# Patient Record
Sex: FEMALE | Race: WHITE | Employment: FULL TIME | URBAN - METROPOLITAN AREA
[De-identification: names, ages, dates, MRNs, and addresses within clinical notes are randomized per-mention and may not be internally consistent; named-entity substitution may affect disease eponyms.]

---

## 2017-04-27 PROBLEM — O34.40 HISTORY OF LEEP (LOOP ELECTROSURGICAL EXCISION PROCEDURE) OF CERVIX COMPLICATING PREGNANCY: Status: ACTIVE | Noted: 2017-04-27

## 2017-04-27 PROBLEM — Z34.80 ENCOUNTER FOR SUPERVISION OF OTHER NORMAL PREGNANCY: Status: ACTIVE | Noted: 2017-04-27

## 2017-04-27 PROBLEM — Z98.890 HISTORY OF LEEP (LOOP ELECTROSURGICAL EXCISION PROCEDURE) OF CERVIX COMPLICATING PREGNANCY: Status: ACTIVE | Noted: 2017-04-27

## 2017-04-27 PROCEDURE — 87491 CHLMYD TRACH DNA AMP PROBE: CPT | Performed by: OBSTETRICS & GYNECOLOGY

## 2017-04-27 PROCEDURE — 87624 HPV HI-RISK TYP POOLED RSLT: CPT | Performed by: OBSTETRICS & GYNECOLOGY

## 2017-04-27 PROCEDURE — 88175 CYTOPATH C/V AUTO FLUID REDO: CPT | Performed by: OBSTETRICS & GYNECOLOGY

## 2017-04-27 PROCEDURE — 87591 N.GONORRHOEAE DNA AMP PROB: CPT | Performed by: OBSTETRICS & GYNECOLOGY

## 2017-06-07 PROCEDURE — 87389 HIV-1 AG W/HIV-1&-2 AB AG IA: CPT | Performed by: OBSTETRICS & GYNECOLOGY

## 2017-06-07 PROCEDURE — 85025 COMPLETE CBC W/AUTO DIFF WBC: CPT | Performed by: OBSTETRICS & GYNECOLOGY

## 2017-06-07 PROCEDURE — 86900 BLOOD TYPING SEROLOGIC ABO: CPT | Performed by: OBSTETRICS & GYNECOLOGY

## 2017-06-07 PROCEDURE — 36415 COLL VENOUS BLD VENIPUNCTURE: CPT | Performed by: OBSTETRICS & GYNECOLOGY

## 2017-06-07 PROCEDURE — 86780 TREPONEMA PALLIDUM: CPT | Performed by: OBSTETRICS & GYNECOLOGY

## 2017-06-07 PROCEDURE — 86901 BLOOD TYPING SEROLOGIC RH(D): CPT | Performed by: OBSTETRICS & GYNECOLOGY

## 2017-06-07 PROCEDURE — 86850 RBC ANTIBODY SCREEN: CPT | Performed by: OBSTETRICS & GYNECOLOGY

## 2017-06-07 PROCEDURE — 86762 RUBELLA ANTIBODY: CPT | Performed by: OBSTETRICS & GYNECOLOGY

## 2017-06-07 PROCEDURE — 87340 HEPATITIS B SURFACE AG IA: CPT | Performed by: OBSTETRICS & GYNECOLOGY

## 2017-06-08 PROBLEM — O99.210 OBESITY AFFECTING PREGNANCY, ANTEPARTUM: Status: ACTIVE | Noted: 2017-06-08

## 2017-06-26 PROCEDURE — 87186 SC STD MICRODIL/AGAR DIL: CPT | Performed by: OBSTETRICS & GYNECOLOGY

## 2017-06-26 PROCEDURE — 87086 URINE CULTURE/COLONY COUNT: CPT | Performed by: OBSTETRICS & GYNECOLOGY

## 2017-06-26 PROCEDURE — 87088 URINE BACTERIA CULTURE: CPT | Performed by: OBSTETRICS & GYNECOLOGY

## 2017-07-27 NOTE — PROGRESS NOTES
Outpatient Maternal-Fetal Medicine Consultation    Dear Dr. Jose F Barrera    Thank you for requesting ultrasound evaluation and maternal fetal medicine consultation on your patient Manuela Draper.   As you are aware she is a 28year old female  with a Sin ____________________________________________________________________________  Dating:  LMP: 02/28/2017 EDC: 12/05/2017 GA by LMP: [id number]  Current Scan on: 07/28/2017 EDC: 11/27/2017 GA by current scan: 22w4d  The calculation of the gestational age by curr interpreted the results and reviewed them with the patient. DISCUSSION  During her visit we discussed and reviewed the following issues:  PRIOR LEEP  We discussed her history of LEEP and potential impact on her risk for  delivery.   Surgical histo such as diabetes mellitus. Data suggest that obese women should be encouraged to undertake a weight reduction program (diet, exercise, behavior modification, and possibly bariatric surgery in some cases) prior to attempting to conceive.             Mariana Parada prepregnancy obesity and excessive maternal weight gain before or during pregnancy contribute to an increased probability of  delivery.   It has also been hypothesized that obesity may lead to dystocia due to increased soft tissue deposition in the

## 2017-07-28 ENCOUNTER — OFFICE VISIT (OUTPATIENT)
Dept: PERINATAL CARE | Facility: HOSPITAL | Age: 32
End: 2017-07-28
Attending: OBSTETRICS & GYNECOLOGY
Payer: COMMERCIAL

## 2017-07-28 VITALS
DIASTOLIC BLOOD PRESSURE: 60 MMHG | WEIGHT: 250 LBS | BODY MASS INDEX: 41 KG/M2 | SYSTOLIC BLOOD PRESSURE: 120 MMHG | HEART RATE: 91 BPM

## 2017-07-28 DIAGNOSIS — Z98.890 HISTORY OF LEEP (LOOP ELECTROSURGICAL EXCISION PROCEDURE) OF CERVIX COMPLICATING PREGNANCY, SECOND TRIMESTER: ICD-10-CM

## 2017-07-28 DIAGNOSIS — O34.42 HISTORY OF LEEP (LOOP ELECTROSURGICAL EXCISION PROCEDURE) OF CERVIX COMPLICATING PREGNANCY, SECOND TRIMESTER: ICD-10-CM

## 2017-07-28 DIAGNOSIS — O99.212 OBESITY AFFECTING PREGNANCY, ANTEPARTUM, SECOND TRIMESTER: ICD-10-CM

## 2017-07-28 PROCEDURE — 99243 OFF/OP CNSLTJ NEW/EST LOW 30: CPT

## 2017-09-20 PROCEDURE — 86780 TREPONEMA PALLIDUM: CPT | Performed by: OBSTETRICS & GYNECOLOGY

## 2017-09-20 PROCEDURE — 82950 GLUCOSE TEST: CPT | Performed by: OBSTETRICS & GYNECOLOGY

## 2017-09-24 ENCOUNTER — HOSPITAL ENCOUNTER (OUTPATIENT)
Facility: HOSPITAL | Age: 32
Setting detail: OBSERVATION
Discharge: HOME | End: 2017-09-24
Attending: OBSTETRICS & GYNECOLOGY | Admitting: OBSTETRICS & GYNECOLOGY
Payer: COMMERCIAL

## 2017-09-24 VITALS — BODY MASS INDEX: 45.07 KG/M2 | WEIGHT: 264 LBS | HEIGHT: 64 IN | TEMPERATURE: 98 F | RESPIRATION RATE: 18 BRPM

## 2017-09-24 PROBLEM — Z34.90 PREGNANCY: Status: ACTIVE | Noted: 2017-09-24

## 2017-09-24 LAB
BILIRUBIN URINE: NEGATIVE
BLOOD URINE: NEGATIVE
CONTROL RUN WITHIN 24 HOURS?: YES
GLUCOSE URINE: NEGATIVE
KETONE URINE: NEGATIVE
NITRITE URINE: NEGATIVE
PH URINE: 7 (ref 5–8)
PROTEIN URINE: NEGATIVE
SPEC GRAVITY: 1.01 (ref 1–1.03)
UROBILINOGEN URINE: 0.2

## 2017-09-24 PROCEDURE — 81002 URINALYSIS NONAUTO W/O SCOPE: CPT

## 2017-09-24 PROCEDURE — 84112 EVAL AMNIOTIC FLUID PROTEIN: CPT

## 2017-09-24 NOTE — PROGRESS NOTES
Received th ept to the unit with c/o decreased fm and abd pressure. Pt to triage room to change and to void. Pt then into bed that is in the low locked position. efm explained and then applied. Hx obtained- pt is a 31  with an 29.5 week iup.   Pt c/

## 2017-09-29 PROCEDURE — 82952 GTT-ADDED SAMPLES: CPT | Performed by: OBSTETRICS & GYNECOLOGY

## 2017-09-29 PROCEDURE — 36415 COLL VENOUS BLD VENIPUNCTURE: CPT | Performed by: OBSTETRICS & GYNECOLOGY

## 2017-09-29 PROCEDURE — 82951 GLUCOSE TOLERANCE TEST (GTT): CPT | Performed by: OBSTETRICS & GYNECOLOGY

## 2017-10-09 NOTE — PROGRESS NOTES
Giles Haney    Dear Dr. Ashlee Lopez    Thank you for requesting ultrasound evaluation and maternal fetal medicine consultation on your patient Jared Mays.   As you are aware she is a 28year old female  with a [place] cerebellum. Heart: suboptimal.   Gastrointestinal Tract: stomach visible.     Summary of Ultrasound Findings:  Impression: Borderline LGA    ____________________________________________________________________________  Fetal Wellbeing Assessment:  Amniotic postprandial values should be less than 120 mg/dl. She was also  Advised to send her capillary blood glucose records to our office for weekly MFM review. Insulin therapy may be started depending on her blood sugar levels.     Her capillary blood glucose r

## 2017-10-10 ENCOUNTER — OFFICE VISIT (OUTPATIENT)
Dept: PERINATAL CARE | Facility: HOSPITAL | Age: 32
End: 2017-10-10
Attending: OBSTETRICS & GYNECOLOGY
Payer: COMMERCIAL

## 2017-10-10 VITALS
SYSTOLIC BLOOD PRESSURE: 125 MMHG | HEART RATE: 109 BPM | DIASTOLIC BLOOD PRESSURE: 81 MMHG | BODY MASS INDEX: 45 KG/M2 | WEIGHT: 265 LBS

## 2017-10-10 DIAGNOSIS — O99.210 OBESITY AFFECTING PREGNANCY, ANTEPARTUM: ICD-10-CM

## 2017-10-10 DIAGNOSIS — O24.419 GESTATIONAL DIABETES MELLITUS (GDM) IN THIRD TRIMESTER, GESTATIONAL DIABETES METHOD OF CONTROL UNSPECIFIED: ICD-10-CM

## 2017-10-10 PROCEDURE — 76805 OB US >/= 14 WKS SNGL FETUS: CPT | Performed by: OBSTETRICS & GYNECOLOGY

## 2017-10-10 PROCEDURE — 99242 OFF/OP CONSLTJ NEW/EST SF 20: CPT | Performed by: OBSTETRICS & GYNECOLOGY

## 2017-10-10 PROCEDURE — 76819 FETAL BIOPHYS PROFIL W/O NST: CPT | Performed by: OBSTETRICS & GYNECOLOGY

## 2017-10-20 ENCOUNTER — TELEPHONE (OUTPATIENT)
Dept: PERINATAL CARE | Facility: HOSPITAL | Age: 32
End: 2017-10-20

## 2017-10-30 ENCOUNTER — HOSPITAL ENCOUNTER (OUTPATIENT)
Facility: HOSPITAL | Age: 32
Setting detail: OBSERVATION
Discharge: HOME | End: 2017-10-31
Attending: OBSTETRICS & GYNECOLOGY | Admitting: OBSTETRICS & GYNECOLOGY
Payer: COMMERCIAL

## 2017-10-30 PROBLEM — O60.00 PRETERM LABOR: Status: ACTIVE | Noted: 2017-10-30

## 2017-10-30 PROCEDURE — 82962 GLUCOSE BLOOD TEST: CPT

## 2017-10-30 PROCEDURE — 81002 URINALYSIS NONAUTO W/O SCOPE: CPT

## 2017-10-30 PROCEDURE — 82731 ASSAY OF FETAL FIBRONECTIN: CPT | Performed by: OBSTETRICS & GYNECOLOGY

## 2017-10-30 RX ORDER — ACETAMINOPHEN 500 MG
500 TABLET ORAL EVERY 6 HOURS PRN
Status: DISCONTINUED | OUTPATIENT
Start: 2017-10-30 | End: 2017-10-31

## 2017-10-30 RX ORDER — CALCIUM CARBONATE 200(500)MG
1000 TABLET,CHEWABLE ORAL
Status: DISCONTINUED | OUTPATIENT
Start: 2017-10-30 | End: 2017-10-31

## 2017-10-30 RX ORDER — NIFEDIPINE 10 MG/1
10 CAPSULE ORAL
Status: DISCONTINUED | OUTPATIENT
Start: 2017-10-30 | End: 2017-10-30

## 2017-10-30 RX ORDER — ACETAMINOPHEN 500 MG
1000 TABLET ORAL EVERY 6 HOURS PRN
Status: DISCONTINUED | OUTPATIENT
Start: 2017-10-30 | End: 2017-10-31

## 2017-10-30 RX ORDER — NIFEDIPINE 10 MG/1
10 CAPSULE ORAL EVERY 6 HOURS
Status: DISCONTINUED | OUTPATIENT
Start: 2017-10-30 | End: 2017-10-31

## 2017-10-30 RX ORDER — ZOLPIDEM TARTRATE 5 MG/1
5 TABLET ORAL NIGHTLY PRN
Status: DISCONTINUED | OUTPATIENT
Start: 2017-10-30 | End: 2017-10-31

## 2017-10-30 RX ORDER — DOCUSATE SODIUM 100 MG/1
100 CAPSULE, LIQUID FILLED ORAL 2 TIMES DAILY PRN
Status: DISCONTINUED | OUTPATIENT
Start: 2017-10-30 | End: 2017-10-31

## 2017-10-31 VITALS
BODY MASS INDEX: 47.13 KG/M2 | HEIGHT: 63 IN | WEIGHT: 266 LBS | RESPIRATION RATE: 18 BRPM | SYSTOLIC BLOOD PRESSURE: 130 MMHG | HEART RATE: 89 BPM | TEMPERATURE: 98 F | DIASTOLIC BLOOD PRESSURE: 73 MMHG

## 2017-10-31 PROCEDURE — 82962 GLUCOSE BLOOD TEST: CPT

## 2017-10-31 PROCEDURE — 59025 FETAL NON-STRESS TEST: CPT

## 2017-10-31 NOTE — H&P
[facility]  Labor and Delivery Prenatal History and Physical Interval Addendum/Triage assessment  Please see full Prenatal Record for this pregnancy      SUBJECTIVE:    Interval History:      This is a pregnancy at 29 6/7 weeks who presents to Labor and Del

## 2017-10-31 NOTE — PROGRESS NOTES
Dr. Melvina Hager at bedside evaluating patient in person. No new complaints. Dr. Melvina Hager states patient can stop taking procardia and will be discharged home. Patient states no noticeable uterine activity or contractions. No leaking of fluid noted.  Non stress test

## 2017-10-31 NOTE — PLAN OF CARE
ANTEPARTUM/LABOR and DELIVERY    • Maintain pregnancy as long as maternal and/or fetal condition is stable Completed    • Anxiety is at manageable level Completed    • Demonstrates ability to cope with hospitalization/illness Completed

## 2017-10-31 NOTE — PROGRESS NOTES
Feeling contractions much less  FFN neg  UC still noted on EFM but less frequent and harder to determine. Given degree of contractions on presentation, and cervical effacement, will admit for observation overnight and continue nifedipine.

## 2017-10-31 NOTE — PROGRESS NOTES
Patient instructed on discharge education.  Educated to follow up with OB provider if uterine activity becomes more frequent or stronger, experiences low pressure, abdominal pain, vaginal bleeding, fluid leaking, or signs and symptoms of preeclampsia (expla

## 2017-10-31 NOTE — PROGRESS NOTES
Received pt to the unit via ambulation with c/o ctx q 9 min all day. Pt to triage room to change and to void. Pt then into bed that is in the low locked position. efm explained and then applied. Abdomen is soft and non-tender.   Hx obtained- pt is a 31

## 2017-11-01 NOTE — PAYOR COMM NOTE
--------------  CONTINUED STAY REVIEW    Payor: [address] #:  [id number]  Authorization Number: [id number]    Admit date: N/A  Admit time: N/A    Admitting Physician: Linus Johnson MD  Attending Physician:  No att.

## 2017-11-01 NOTE — PAYOR COMM NOTE
--------------  ADMISSION REVIEW     Payor: [address] [facility] [place] #:  [id number]  Authorization Number: [id number]    Admit date: N/A  Admit time: N/A       Admitting Physician: Angelique Neri MD  Attending Physician:  No att.

## 2017-11-06 ENCOUNTER — TELEPHONE (OUTPATIENT)
Dept: PERINATAL CARE | Facility: HOSPITAL | Age: 32
End: 2017-11-06

## 2017-11-08 PROCEDURE — 87081 CULTURE SCREEN ONLY: CPT | Performed by: OBSTETRICS & GYNECOLOGY

## 2017-11-08 PROCEDURE — 87653 STREP B DNA AMP PROBE: CPT | Performed by: OBSTETRICS & GYNECOLOGY

## 2017-11-09 NOTE — PROGRESS NOTES
Jero Kinney  Dear Dr. Gen Stevens     Thank you for requesting ultrasound evaluation and maternal fetal medicine consultation on your patient Neno Diaz.   As you are aware she is a 28year old female  with a Si suggest.    ____________________________________________________________________________  Fetal Wellbeing Assessment:  Amniotic fluid: normal. AMY: 18.7 cm.    Biophysical Profile: Fetal body movements: normal (2), Fetal tone: normal (2), Fetal breathing mo glucose values  · Delivery at 38 weeks  · Weekly NSTs at 32 weeks; twice weekly NST's at 36 weeks     Thank you for allowing me to participate in the care of your patient. Please do not hesitate to contact me if additional questions or concerns arise.

## 2017-11-10 ENCOUNTER — OFFICE VISIT (OUTPATIENT)
Dept: PERINATAL CARE | Facility: HOSPITAL | Age: 32
End: 2017-11-10
Attending: OBSTETRICS & GYNECOLOGY
Payer: COMMERCIAL

## 2017-11-10 VITALS
WEIGHT: 270 LBS | DIASTOLIC BLOOD PRESSURE: 80 MMHG | RESPIRATION RATE: 20 BRPM | SYSTOLIC BLOOD PRESSURE: 133 MMHG | BODY MASS INDEX: 43.39 KG/M2 | HEIGHT: 66 IN | HEART RATE: 130 BPM

## 2017-11-10 DIAGNOSIS — O24.419 GESTATIONAL DIABETES MELLITUS (GDM) IN THIRD TRIMESTER, GESTATIONAL DIABETES METHOD OF CONTROL UNSPECIFIED: ICD-10-CM

## 2017-11-10 DIAGNOSIS — O99.820 GBS (GROUP B STREPTOCOCCUS CARRIER), +RV CULTURE, CURRENTLY PREGNANT: ICD-10-CM

## 2017-11-10 DIAGNOSIS — O99.210 OBESITY AFFECTING PREGNANCY, ANTEPARTUM: ICD-10-CM

## 2017-11-10 DIAGNOSIS — O24.415 GESTATIONAL DIABETES MELLITUS (GDM) IN THIRD TRIMESTER CONTROLLED ON ORAL HYPOGLYCEMIC DRUG: ICD-10-CM

## 2017-11-10 PROCEDURE — 76819 FETAL BIOPHYS PROFIL W/O NST: CPT | Performed by: OBSTETRICS & GYNECOLOGY

## 2017-11-10 PROCEDURE — 76805 OB US >/= 14 WKS SNGL FETUS: CPT | Performed by: OBSTETRICS & GYNECOLOGY

## 2017-11-10 PROCEDURE — 99214 OFFICE O/P EST MOD 30 MIN: CPT | Performed by: OBSTETRICS & GYNECOLOGY

## 2017-11-17 ENCOUNTER — TELEPHONE (OUTPATIENT)
Dept: OBGYN UNIT | Facility: HOSPITAL | Age: 32
End: 2017-11-17

## 2017-11-20 ENCOUNTER — APPOINTMENT (OUTPATIENT)
Dept: OBGYN CLINIC | Facility: HOSPITAL | Age: 32
End: 2017-11-20
Payer: COMMERCIAL

## 2017-11-20 ENCOUNTER — HOSPITAL ENCOUNTER (INPATIENT)
Facility: HOSPITAL | Age: 32
LOS: 5 days | Discharge: HOME | End: 2017-11-25
Attending: OBSTETRICS & GYNECOLOGY | Admitting: OBSTETRICS & GYNECOLOGY
Payer: COMMERCIAL

## 2017-11-20 PROCEDURE — 81002 URINALYSIS NONAUTO W/O SCOPE: CPT

## 2017-11-20 PROCEDURE — 86850 RBC ANTIBODY SCREEN: CPT | Performed by: OBSTETRICS & GYNECOLOGY

## 2017-11-20 PROCEDURE — 82962 GLUCOSE BLOOD TEST: CPT

## 2017-11-20 PROCEDURE — 86900 BLOOD TYPING SEROLOGIC ABO: CPT | Performed by: OBSTETRICS & GYNECOLOGY

## 2017-11-20 PROCEDURE — 86780 TREPONEMA PALLIDUM: CPT | Performed by: OBSTETRICS & GYNECOLOGY

## 2017-11-20 PROCEDURE — 86901 BLOOD TYPING SEROLOGIC RH(D): CPT | Performed by: OBSTETRICS & GYNECOLOGY

## 2017-11-20 PROCEDURE — 85027 COMPLETE CBC AUTOMATED: CPT | Performed by: OBSTETRICS & GYNECOLOGY

## 2017-11-20 PROCEDURE — 3E0P7VZ INTRODUCTION OF HORMONE INTO FEMALE REPRODUCTIVE, VIA NATURAL OR ARTIFICIAL OPENING: ICD-10-PCS | Performed by: OBSTETRICS & GYNECOLOGY

## 2017-11-20 RX ORDER — SODIUM CHLORIDE, SODIUM LACTATE, POTASSIUM CHLORIDE, CALCIUM CHLORIDE 600; 310; 30; 20 MG/100ML; MG/100ML; MG/100ML; MG/100ML
INJECTION, SOLUTION INTRAVENOUS CONTINUOUS
Status: DISCONTINUED | OUTPATIENT
Start: 2017-11-20 | End: 2017-11-25

## 2017-11-20 RX ORDER — TRISODIUM CITRATE DIHYDRATE AND CITRIC ACID MONOHYDRATE 500; 334 MG/5ML; MG/5ML
30 SOLUTION ORAL AS NEEDED
Status: DISCONTINUED | OUTPATIENT
Start: 2017-11-20 | End: 2017-11-25

## 2017-11-20 RX ORDER — TERBUTALINE SULFATE 1 MG/ML
0.25 INJECTION, SOLUTION SUBCUTANEOUS AS NEEDED
Status: DISCONTINUED | OUTPATIENT
Start: 2017-11-20 | End: 2017-11-25

## 2017-11-20 RX ORDER — ZOLPIDEM TARTRATE 5 MG/1
5 TABLET ORAL NIGHTLY PRN
Status: DISCONTINUED | OUTPATIENT
Start: 2017-11-20 | End: 2017-11-25

## 2017-11-20 RX ORDER — IBUPROFEN 600 MG/1
600 TABLET ORAL ONCE AS NEEDED
Status: ACTIVE | OUTPATIENT
Start: 2017-11-20 | End: 2017-11-22

## 2017-11-20 RX ORDER — DEXTROSE, SODIUM CHLORIDE, SODIUM LACTATE, POTASSIUM CHLORIDE, AND CALCIUM CHLORIDE 5; .6; .31; .03; .02 G/100ML; G/100ML; G/100ML; G/100ML; G/100ML
INJECTION, SOLUTION INTRAVENOUS AS NEEDED
Status: DISCONTINUED | OUTPATIENT
Start: 2017-11-20 | End: 2017-11-25

## 2017-11-20 RX ORDER — HYDROMORPHONE HYDROCHLORIDE 1 MG/ML
1 INJECTION, SOLUTION INTRAMUSCULAR; INTRAVENOUS; SUBCUTANEOUS
Status: DISCONTINUED | OUTPATIENT
Start: 2017-11-20 | End: 2017-11-25

## 2017-11-20 RX ORDER — CALCIUM CARBONATE 200(500)MG
TABLET,CHEWABLE ORAL
Status: DISPENSED
Start: 2017-11-20 | End: 2017-11-21

## 2017-11-21 ENCOUNTER — ANESTHESIA EVENT (OUTPATIENT)
Dept: OBGYN UNIT | Facility: HOSPITAL | Age: 32
End: 2017-11-21
Payer: COMMERCIAL

## 2017-11-21 ENCOUNTER — SURGERY (OUTPATIENT)
Age: 32
End: 2017-11-21

## 2017-11-21 ENCOUNTER — ANESTHESIA (OUTPATIENT)
Dept: OBGYN UNIT | Facility: HOSPITAL | Age: 32
End: 2017-11-21
Payer: COMMERCIAL

## 2017-11-21 PROBLEM — Z98.891 S/P C-SECTION: Status: ACTIVE | Noted: 2017-11-21

## 2017-11-21 PROCEDURE — 3E033VJ INTRODUCTION OF OTHER HORMONE INTO PERIPHERAL VEIN, PERCUTANEOUS APPROACH: ICD-10-PCS | Performed by: OBSTETRICS & GYNECOLOGY

## 2017-11-21 PROCEDURE — A4216 STERILE WATER/SALINE, 10 ML: HCPCS | Performed by: OBSTETRICS & GYNECOLOGY

## 2017-11-21 RX ORDER — DIPHENHYDRAMINE HCL 25 MG
25 CAPSULE ORAL EVERY 4 HOURS PRN
Status: DISCONTINUED | OUTPATIENT
Start: 2017-11-21 | End: 2017-11-25

## 2017-11-21 RX ORDER — BISACODYL 10 MG
10 SUPPOSITORY, RECTAL RECTAL
Status: DISCONTINUED | OUTPATIENT
Start: 2017-11-21 | End: 2017-11-25

## 2017-11-21 RX ORDER — SIMETHICONE 80 MG
80 TABLET,CHEWABLE ORAL 3 TIMES DAILY PRN
Status: DISCONTINUED | OUTPATIENT
Start: 2017-11-21 | End: 2017-11-25

## 2017-11-21 RX ORDER — DOCUSATE SODIUM 100 MG/1
100 CAPSULE, LIQUID FILLED ORAL
Status: DISCONTINUED | OUTPATIENT
Start: 2017-11-22 | End: 2017-11-25

## 2017-11-21 RX ORDER — ONDANSETRON 2 MG/ML
4 INJECTION INTRAMUSCULAR; INTRAVENOUS EVERY 6 HOURS PRN
Status: DISCONTINUED | OUTPATIENT
Start: 2017-11-21 | End: 2017-11-25

## 2017-11-21 RX ORDER — ONDANSETRON 2 MG/ML
4 INJECTION INTRAMUSCULAR; INTRAVENOUS ONCE AS NEEDED
Status: DISCONTINUED | OUTPATIENT
Start: 2017-11-21 | End: 2017-11-21 | Stop reason: HOSPADM

## 2017-11-21 RX ORDER — DEXTROSE, SODIUM CHLORIDE, SODIUM LACTATE, POTASSIUM CHLORIDE, AND CALCIUM CHLORIDE 5; .6; .31; .03; .02 G/100ML; G/100ML; G/100ML; G/100ML; G/100ML
INJECTION, SOLUTION INTRAVENOUS CONTINUOUS
Status: DISCONTINUED | OUTPATIENT
Start: 2017-11-22 | End: 2017-11-25

## 2017-11-21 RX ORDER — DEXTROSE MONOHYDRATE 25 G/50ML
50 INJECTION, SOLUTION INTRAVENOUS
Status: DISCONTINUED | OUTPATIENT
Start: 2017-11-21 | End: 2017-11-21 | Stop reason: HOSPADM

## 2017-11-21 RX ORDER — CEFAZOLIN SODIUM 1 G/3ML
INJECTION, POWDER, FOR SOLUTION INTRAMUSCULAR; INTRAVENOUS
Status: DISCONTINUED | OUTPATIENT
Start: 2017-11-21 | End: 2017-11-21

## 2017-11-21 RX ORDER — AMPICILLIN 1 G/1
1 INJECTION, POWDER, FOR SOLUTION INTRAMUSCULAR; INTRAVENOUS EVERY 4 HOURS
Status: DISCONTINUED | OUTPATIENT
Start: 2017-11-21 | End: 2017-11-24 | Stop reason: ALTCHOICE

## 2017-11-21 RX ORDER — DIPHENHYDRAMINE HYDROCHLORIDE 50 MG/ML
12.5 INJECTION INTRAMUSCULAR; INTRAVENOUS EVERY 4 HOURS PRN
Status: DISCONTINUED | OUTPATIENT
Start: 2017-11-21 | End: 2017-11-25

## 2017-11-21 RX ORDER — SODIUM CHLORIDE, SODIUM LACTATE, POTASSIUM CHLORIDE, CALCIUM CHLORIDE 600; 310; 30; 20 MG/100ML; MG/100ML; MG/100ML; MG/100ML
INJECTION, SOLUTION INTRAVENOUS CONTINUOUS
Status: DISCONTINUED | OUTPATIENT
Start: 2017-11-21 | End: 2017-11-25

## 2017-11-21 RX ORDER — HYDROCODONE BITARTRATE AND ACETAMINOPHEN 10; 325 MG/1; MG/1
1 TABLET ORAL EVERY 4 HOURS PRN
Status: DISCONTINUED | OUTPATIENT
Start: 2017-11-21 | End: 2017-11-25

## 2017-11-21 RX ORDER — KETOROLAC TROMETHAMINE 30 MG/ML
30 INJECTION, SOLUTION INTRAMUSCULAR; INTRAVENOUS EVERY 6 HOURS PRN
Status: DISPENSED | OUTPATIENT
Start: 2017-11-21 | End: 2017-11-22

## 2017-11-21 RX ORDER — NALBUPHINE HCL 10 MG/ML
2.5 AMPUL (ML) INJECTION EVERY 4 HOURS PRN
Status: DISCONTINUED | OUTPATIENT
Start: 2017-11-21 | End: 2017-11-25

## 2017-11-21 RX ORDER — NALBUPHINE HCL 10 MG/ML
2.5 AMPUL (ML) INJECTION
Status: DISCONTINUED | OUTPATIENT
Start: 2017-11-21 | End: 2017-11-21 | Stop reason: HOSPADM

## 2017-11-21 RX ORDER — HYDROCODONE BITARTRATE AND ACETAMINOPHEN 5; 325 MG/1; MG/1
1 TABLET ORAL EVERY 4 HOURS PRN
Status: DISCONTINUED | OUTPATIENT
Start: 2017-11-21 | End: 2017-11-25

## 2017-11-21 RX ORDER — KETOROLAC TROMETHAMINE 30 MG/ML
30 INJECTION, SOLUTION INTRAMUSCULAR; INTRAVENOUS ONCE AS NEEDED
Status: COMPLETED | OUTPATIENT
Start: 2017-11-21 | End: 2017-11-21

## 2017-11-21 RX ORDER — NALOXONE HYDROCHLORIDE 0.4 MG/ML
0.08 INJECTION, SOLUTION INTRAMUSCULAR; INTRAVENOUS; SUBCUTANEOUS
Status: ACTIVE | OUTPATIENT
Start: 2017-11-21 | End: 2017-11-22

## 2017-11-21 RX ORDER — TRISODIUM CITRATE DIHYDRATE AND CITRIC ACID MONOHYDRATE 500; 334 MG/5ML; MG/5ML
30 SOLUTION ORAL ONCE
Status: DISCONTINUED | OUTPATIENT
Start: 2017-11-21 | End: 2017-11-21 | Stop reason: HOSPADM

## 2017-11-21 RX ORDER — IBUPROFEN 600 MG/1
600 TABLET ORAL EVERY 6 HOURS SCHEDULED
Status: DISCONTINUED | OUTPATIENT
Start: 2017-11-22 | End: 2017-11-25

## 2017-11-21 RX ORDER — MORPHINE SULFATE 0.5 MG/ML
0.2 INJECTION, SOLUTION EPIDURAL; INTRATHECAL; INTRAVENOUS ONCE
Status: DISCONTINUED | OUTPATIENT
Start: 2017-11-21 | End: 2017-11-25

## 2017-11-21 RX ORDER — DIPHENHYDRAMINE HYDROCHLORIDE 50 MG/ML
25 INJECTION INTRAMUSCULAR; INTRAVENOUS ONCE AS NEEDED
Status: DISCONTINUED | OUTPATIENT
Start: 2017-11-21 | End: 2017-11-21 | Stop reason: HOSPADM

## 2017-11-21 NOTE — H&P
[address] and Delivery Prenatal History and Physical Interval Addendum  Please see full Prenatal Record for this pregnancy      SUBJECTIVE:    Interval History:      This is a pregnancy at 37w6d weeks admitted for IOL due to A2 GDM at 38 weeks

## 2017-11-21 NOTE — PLAN OF CARE
Problem: Patient/Family Goals  Goal: Patient/Family Short Term Goal  Patient's Short Term Goal: adaquate pain control  Interventions:   -   - See additional Care Plan goals for specific interventions   Outcome: Progressing

## 2017-11-21 NOTE — PROGRESS NOTES
45 w Cdil/IOL for GDM A2  gbs +  Hx leep    Cervidil overnight, no strong cramps  Discussed IOL expectations. Will start oxytocin this morning.

## 2017-11-21 NOTE — ANESTHESIA PREPROCEDURE EVALUATION
PRE-OP EVALUATION    Patient Name: Rosa Maria Roberto    Pre-op Diagnosis: * No pre-op diagnosis entered *    Procedure(s):      Surgeon(s) and Role:     * Eliseo Jimenez MD - Primary    Pre-op vitals reviewed.   Temp: 98 °F (36.7 °C)  Pulse: 94  Resp: multivitamin plus DHA 27-0.8-228 MG Oral Cap Take 1 capsule by mouth daily. Disp:  Rfl:    Doxylamine Succinate, Sleep, (UNISOM) 25 MG Oral Tab Take 25 mg by mouth nightly as needed for Sleep. Disp:  Rfl:        Allergies: Patient has no known allergies. Possible headache,decreased blood p(ressure,bleeding, infection, high level, nerve damage explained. Agrees to proceed.   Plan/risks discussed with: patient and spouse                Present on Admission:  **None**

## 2017-11-21 NOTE — PLAN OF CARE
Problem: Patient/Family Goals  Goal: Patient/Family Long Term Goal  Patient's Long Term Goal: healthy mom and baby    Interventions:  -   - See additional Care Plan goals for specific interventions   Outcome: Progressing

## 2017-11-21 NOTE — PROGRESS NOTES
Received the pt to the unit via ambulation for a scheduled induction for gdm. Pt is a  with an 37.6 week iup. Pt denies any ob complications or=ther than gdm- on metformin. States bs are well controlled since increased dose of metformin.   Pt into th

## 2017-11-21 NOTE — PROGRESS NOTES
[facility]      Labor Progress Note    Phong Gleason Patient Status:  Inpatient    1985 MRN [id number]   Location [address] [facility] Attending Chandni Crystal MD   Hosp Day # 1 PCP Rachid Joseph MD     SUBJECTIVE:    Adrienne Gomez

## 2017-11-22 PROCEDURE — 85025 COMPLETE CBC W/AUTO DIFF WBC: CPT | Performed by: OBSTETRICS & GYNECOLOGY

## 2017-11-22 NOTE — PLAN OF CARE
Problem: SAFETY ADULT - FALL  Goal: Free from fall injury  INTERVENTIONS:  - Assess pt frequently for physical needs  - Identify cognitive and physical deficits and behaviors that affect risk of falls.   - [place] fall precautions as indicated by assessme

## 2017-11-22 NOTE — PROGRESS NOTES
NURSING ADMISSION NOTE      Patient admitted via Cart  Oriented to room. Safety precautions initiated. Bed in low position. Call light in reach. Assessment completed. POC discussed with pt and spouse. Both verbalized understanding. Baby in NICU.  B

## 2017-11-22 NOTE — PROGRESS NOTES
Pt transferred  to Mother Baby room 2196 in stable condition. Report given to BHUMI Rudd. Infant transferred with mother in stable condition.

## 2017-11-22 NOTE — PROGRESS NOTES
[facility]    Patients Name: Juliocesar Marquez  Attending Physician: Agustin Joseph MD  CSN: [id number]    Location:  2196/2196-A  MRN: [id number]    YOB: 1985  Admission Date: 11/20/2017     Obstetric Anesthesia Pain Progress Note    Pos

## 2017-11-22 NOTE — PROGRESS NOTES
[facility]      Labor Progress Note    Severiano Peed Patient Status:  Inpatient    1985 MRN [id number]   Location [address] [facility] Attending Santiago Stokes MD   Hosp Day # 1 PCP Bennett Yoo MD     SUBJECTIVE:    Lesa Plaza

## 2017-11-22 NOTE — PROGRESS NOTES
Postpartum Progress Note    SUBJECTIVE:    Post-op day #1 s/p primary  section. No overnight events. No complaints. Has been out of bed, tolerating PO, montano in place, +flatus. Breastfeeding. Baby in NICU.     OBJECTIVE:    Vital signs in las

## 2017-11-22 NOTE — PROGRESS NOTES
Sats drop below 90% when asleep and snoring. O2 started at 2L/nc but pt states she cannot sleep with it on. Importance of keeping Sats >90% explained to pt. Pt still refuses to keep nasal cannula on. Will continue to monitor.

## 2017-11-22 NOTE — OPERATIVE REPORT
[facility]  Post-Operative Note    Juliocesar Marquez Patient Status:  Inpatient    1985 MRN [id number]   Location [address] [facility] Attending Agustin Joseph MD   Hosp Day # 1 PCP Alicia Thomas MD     Pre-operative Diagnosis: I dissection. The infant's head was delivered atraumatically. The remainder of the infant was delivered without difficulty. After the umbilical cord was clamped and cut. The cord blood was obtained for evaluation.  The placenta was removed and appeared nor

## 2017-11-22 NOTE — ANESTHESIA POSTPROCEDURE EVALUATION
[address] [facility] Patient Status:  Inpatient   Age/Gender 33year old female MRN [id number]   Location [address] [facility] Attending Shayla Gardner MD   Hosp Day # 1 PCP Alicia Lee MD       Anesthesia Post-op Note    Pro

## 2017-11-23 NOTE — PROGRESS NOTES
Postop Day 2    Pt without complaints. Passing flatus. Pain well-controlled.     Temp:  [97.5 °F (36.4 °C)-98.5 °F (36.9 °C)] 97.8 °F (36.6 °C)  Pulse:  [] 82  Resp:  [18] 18  BP: (114-130)/(63-88) 123/88  abd  soft, NT, ND, fundus firm below umbilicu

## 2017-11-24 RX ORDER — LABETALOL 200 MG/1
200 TABLET, FILM COATED ORAL ONCE
Status: COMPLETED | OUTPATIENT
Start: 2017-11-24 | End: 2017-11-24

## 2017-11-24 RX ORDER — LABETALOL 200 MG/1
200 TABLET, FILM COATED ORAL EVERY 12 HOURS SCHEDULED
Status: DISCONTINUED | OUTPATIENT
Start: 2017-11-24 | End: 2017-11-24

## 2017-11-24 RX ORDER — HYDROCODONE BITARTRATE AND ACETAMINOPHEN 5; 325 MG/1; MG/1
1-2 TABLET ORAL EVERY 6 HOURS PRN
Qty: 40 TABLET | Refills: 0 | Status: SHIPPED | OUTPATIENT
Start: 2017-11-24 | End: 2017-12-08 | Stop reason: ALTCHOICE

## 2017-11-24 RX ORDER — IBUPROFEN 600 MG/1
600 TABLET ORAL EVERY 6 HOURS PRN
Qty: 30 TABLET | Refills: 0 | Status: SHIPPED | OUTPATIENT
Start: 2017-11-24 | End: 2018-01-09 | Stop reason: ALTCHOICE

## 2017-11-24 NOTE — DISCHARGE SUMMARY
Reason for Admission: pregnancy      Hospital Course:   followed by uncomplicated postop course    Complications: none    Disposition: Home or Self Care    Discharge Condition: Good    Discharge Medications: norco, motrin    Follow up Visits:  [facility]

## 2017-11-24 NOTE — PLAN OF CARE
GASTROINTESTINAL - ADULT    • Minimal or absence of nausea and vomiting Completed        GENITOURINARY - ADULT    • Absence of urinary retention Completed          GASTROINTESTINAL - ADULT    • Maintains or returns to baseline bowel function Progressing

## 2017-11-24 NOTE — PLAN OF CARE
GASTROINTESTINAL - ADULT    • Maintains or returns to baseline bowel function Progressing        POSTPARTUM    • Long Term Goal:Experiences normal postpartum course Progressing    • Optimize infant feeding at the breast Progressing    • Establishment of ad

## 2017-11-24 NOTE — PROGRESS NOTES
11/24/17 0011   Provider Notification   Reason for Communication Evaluate  (BP's 137/92, 147/97, denies headache, visual changes or epigastic pain, DTR WNL, no clonus)   Provider Name Other (comment)  (Dr Desiree Hicks)   Method of Communication Call   Response No

## 2017-11-25 VITALS
RESPIRATION RATE: 17 BRPM | DIASTOLIC BLOOD PRESSURE: 91 MMHG | OXYGEN SATURATION: 98 % | WEIGHT: 266 LBS | HEART RATE: 74 BPM | TEMPERATURE: 98 F | SYSTOLIC BLOOD PRESSURE: 145 MMHG | HEIGHT: 64 IN | BODY MASS INDEX: 45.41 KG/M2

## 2017-11-25 PROBLEM — O99.820 GBS (GROUP B STREPTOCOCCUS CARRIER), +RV CULTURE, CURRENTLY PREGNANT: Status: RESOLVED | Noted: 2017-11-10 | Resolved: 2017-11-25

## 2017-11-25 PROBLEM — O60.00 PRETERM LABOR: Status: RESOLVED | Noted: 2017-10-30 | Resolved: 2017-11-25

## 2017-11-25 PROBLEM — Z98.890 HISTORY OF LEEP (LOOP ELECTROSURGICAL EXCISION PROCEDURE) OF CERVIX COMPLICATING PREGNANCY: Status: RESOLVED | Noted: 2017-04-27 | Resolved: 2017-11-25

## 2017-11-25 PROBLEM — Z34.90 PREGNANCY: Status: RESOLVED | Noted: 2017-09-24 | Resolved: 2017-11-25

## 2017-11-25 PROBLEM — O99.210 OBESITY AFFECTING PREGNANCY, ANTEPARTUM: Status: RESOLVED | Noted: 2017-06-08 | Resolved: 2017-11-25

## 2017-11-25 PROBLEM — O34.40 HISTORY OF LEEP (LOOP ELECTROSURGICAL EXCISION PROCEDURE) OF CERVIX COMPLICATING PREGNANCY: Status: RESOLVED | Noted: 2017-04-27 | Resolved: 2017-11-25

## 2017-11-25 PROBLEM — Z34.80 ENCOUNTER FOR SUPERVISION OF OTHER NORMAL PREGNANCY: Status: RESOLVED | Noted: 2017-04-27 | Resolved: 2017-11-25

## 2017-11-25 RX ORDER — LABETALOL 200 MG/1
100 TABLET, FILM COATED ORAL ONCE
Status: COMPLETED | OUTPATIENT
Start: 2017-11-25 | End: 2017-11-25

## 2017-11-25 RX ORDER — LABETALOL 100 MG/1
100 TABLET, FILM COATED ORAL 2 TIMES DAILY
Qty: 60 TABLET | Refills: 0 | Status: SHIPPED | OUTPATIENT
Start: 2017-11-25 | End: 2017-12-15 | Stop reason: ALTCHOICE

## 2017-11-25 NOTE — PROGRESS NOTES
S: pt without complaints.   Positive flatus  O: VS-Temp:  [97.8 °F (36.6 °C)] 97.8 °F (36.6 °C)  Pulse:  [49-78] 76  Resp:  [16] 16  BP: (122-156)/() 122/76       Abdomen: soft, ND, NT  Incision- CDI       Extremities: 2+ edema, NT Bilateral lower ext

## 2017-11-27 ENCOUNTER — TELEPHONE (OUTPATIENT)
Dept: OBGYN UNIT | Facility: HOSPITAL | Age: 32
End: 2017-11-27

## 2017-11-27 NOTE — PROGRESS NOTES
Reviewed self and infant care w / mom, she verbalizes understanding of instructions reviewed. Encourage to follow up w/ MDs as directed and w/ questions/concerns. Possible PIH, all sx reviewed and melany for bp check this week, also enc to go to ct.

## 2017-11-29 ENCOUNTER — NURSE ONLY (OUTPATIENT)
Dept: LACTATION | Facility: HOSPITAL | Age: 32
End: 2017-11-29
Attending: OBSTETRICS & GYNECOLOGY
Payer: COMMERCIAL

## 2017-11-29 VITALS — TEMPERATURE: 98 F

## 2017-11-29 DIAGNOSIS — Z91.89 AT RISK FOR INEFFECTIVE BREASTFEEDING: ICD-10-CM

## 2017-11-29 DIAGNOSIS — Z91.89 BREASTFEEDING PROBLEM: ICD-10-CM

## 2017-11-29 PROCEDURE — 99213 OFFICE O/P EST LOW 20 MIN: CPT

## 2017-11-29 NOTE — PATIENT INSTRUCTIONS
Breastfeeding suggestions to increase milk supply    Full evaluation of your current milk supply and your infant's transfer of breastmilk is important prior to initiation of mediations.     Review of your history and treatment of any medical conditions by clarice before offering formula. · Continue to pump both breasts for 10-15 minutes anytime a supplement is needed. A hospital grade rental pump is recommended.     Discuss the following medications with your physician and your baby's physician:  As with any medic breastmilk? · Swallowing with most sucks (every 1-3 sucks) until satisfied at least 8-12 times every 24 hours. · Compressing the breast when your baby sucks can increase milk flow.   · At least 6-8 wet diapers and at least 3-4 soft, yellow seedy stools ev Call you baby's doctor with questions as well. Weight check sooner if wet or stool diapers decrease. Have weight checked again within 1-3 days of decreasing/stopping supplements. For additional information: Autumn. tongue down and out over the lower lip. Bring baby’s mouth directly over the shield. It may take a few attempts before your baby settles into a rhythmical suckling pattern.   • After several minutes of regular swallowing at the breast, you may want to try t boiled. Follow-up is VERY important! • Let your baby’s health care provider know immediately if it is difficult for you to feed your baby or if the number of wet or stool diapers is inadequate.    • Follow-up visits with your lactation consultant and b not rub on inner circumference of flange. If you need a different size flange, contact your nurse or the lactation department. • Maximum comfort suction is recommended.  Begin with suction low then increase the suction to the highest suction that is comfo videos: Maximizing Milk Production and Hand Expression   [email or web address]    Include night feedings and/or pumping sessions. Your hormone levels are higher at night.     Increasing milk flow to baby if breastfeed

## 2018-09-04 PROCEDURE — 88305 TISSUE EXAM BY PATHOLOGIST: CPT | Performed by: RADIOLOGY

## 2018-09-04 PROCEDURE — 88360 TUMOR IMMUNOHISTOCHEM/MANUAL: CPT | Performed by: RADIOLOGY

## 2018-09-13 PROBLEM — C50.811 MALIGNANT NEOPLASM OF OVERLAPPING SITES OF RIGHT FEMALE BREAST (HCC): Status: ACTIVE | Noted: 2018-09-13

## 2018-12-14 PROBLEM — T45.1X5A CHEMOTHERAPY INDUCED NAUSEA AND VOMITING: Status: ACTIVE | Noted: 2018-12-14

## 2018-12-14 PROBLEM — R11.2 CHEMOTHERAPY INDUCED NAUSEA AND VOMITING: Status: ACTIVE | Noted: 2018-12-14

## 2019-01-09 PROBLEM — R11.0 CHEMOTHERAPY-INDUCED NAUSEA: Status: ACTIVE | Noted: 2019-01-09

## 2019-01-09 PROBLEM — T45.1X5A CHEMOTHERAPY-INDUCED NAUSEA: Status: ACTIVE | Noted: 2019-01-09

## 2019-03-12 ENCOUNTER — OFFICE VISIT (OUTPATIENT)
Dept: SURGERY | Facility: CLINIC | Age: 34
End: 2019-03-12
Payer: COMMERCIAL

## 2019-03-12 VITALS — WEIGHT: 253.81 LBS | BODY MASS INDEX: 43.33 KG/M2 | HEIGHT: 64 IN

## 2019-03-12 DIAGNOSIS — Z17.1 MALIGNANT NEOPLASM OF OVERLAPPING SITES OF RIGHT BREAST IN FEMALE, ESTROGEN RECEPTOR NEGATIVE (HCC): Primary | ICD-10-CM

## 2019-03-12 DIAGNOSIS — C50.811 MALIGNANT NEOPLASM OF OVERLAPPING SITES OF RIGHT BREAST IN FEMALE, ESTROGEN RECEPTOR NEGATIVE (HCC): Primary | ICD-10-CM

## 2019-03-12 PROCEDURE — 99243 OFF/OP CNSLTJ NEW/EST LOW 30: CPT | Performed by: SURGERY

## 2019-03-12 NOTE — CONSULTS
New Patient Consultation    This is the first visit for this 29year old female who presents to discuss reconstructive options following surgery for breast cancer. History of Present Illness:    The patient is a 29year old female who presents with a rig Nausea. Every 6 hours prn nausea or as instructed by physician Disp: 30 tablet Rfl: 3   Ondansetron HCl (ZOFRAN) 8 MG tablet Take 1 tablet (8 mg total) by mouth every 8 (eight) hours as needed for Nausea.  Disp: 20 tablet Rfl: 3   HYDROcodone-acetaminophen asthma, or wheezing. Cardiovascular: The patient denies chest pain/pressure, palpitations, irregular heartbeat, high blood pressure, stroke, or leg swelling. Breasts:   The patient denies breast masses, pain, change in the breast skin, skin dimpling are normal. Her affect is appropriate. HEENT: The head is normocephalic. The neck is supple. The thyroid is not enlarged and is without palpable masses. The trachea is in the midline. Conjunctiva are clear, non-icteric.   Moderate shoulder grooving is n well as the need for a secondary procedure for placement of a permanent implant, were reviewed. Representative illustrations and photographs were demonstrated to the patient.  The risks of surgery including but not limited to bleeding, infection, scarring, flap or Latissimus flap /implant) versus proceeding with immediate reconstruction and accepting previously mentioned risks.   Given the patient's high risk for immediate reconstruction and her need for postmastectomy radiation therapy, I have recommended th

## 2019-07-27 ENCOUNTER — APPOINTMENT (OUTPATIENT)
Dept: CT IMAGING | Facility: HOSPITAL | Age: 34
End: 2019-07-27
Attending: EMERGENCY MEDICINE
Payer: COMMERCIAL

## 2019-07-27 ENCOUNTER — HOSPITAL ENCOUNTER (EMERGENCY)
Facility: HOSPITAL | Age: 34
Discharge: HOME | End: 2019-07-27
Attending: EMERGENCY MEDICINE
Payer: COMMERCIAL

## 2019-07-27 VITALS
WEIGHT: 254 LBS | RESPIRATION RATE: 14 BRPM | HEIGHT: 64 IN | BODY MASS INDEX: 43.36 KG/M2 | OXYGEN SATURATION: 96 % | DIASTOLIC BLOOD PRESSURE: 65 MMHG | TEMPERATURE: 99 F | SYSTOLIC BLOOD PRESSURE: 125 MMHG | HEART RATE: 55 BPM

## 2019-07-27 DIAGNOSIS — G44.209 TENSION HEADACHE: Primary | ICD-10-CM

## 2019-07-27 LAB
ALBUMIN SERPL-MCNC: 4.2 G/DL (ref 3.4–5)
ALBUMIN/GLOB SERPL: 1.1 {RATIO} (ref 1–2)
ALP LIVER SERPL-CCNC: 118 U/L (ref 37–98)
ALT SERPL-CCNC: 146 U/L (ref 13–56)
ANION GAP SERPL CALC-SCNC: 8 MMOL/L (ref 0–18)
AST SERPL-CCNC: 83 U/L (ref 15–37)
BASOPHILS # BLD AUTO: 0.04 X10(3) UL (ref 0–0.2)
BASOPHILS NFR BLD AUTO: 0.4 %
BILIRUB SERPL-MCNC: 0.4 MG/DL (ref 0.1–2)
BUN BLD-MCNC: 8 MG/DL (ref 7–18)
BUN/CREAT SERPL: 9.8 (ref 10–20)
CALCIUM BLD-MCNC: 9 MG/DL (ref 8.5–10.1)
CHLORIDE SERPL-SCNC: 107 MMOL/L (ref 98–112)
CO2 SERPL-SCNC: 24 MMOL/L (ref 21–32)
CREAT BLD-MCNC: 0.82 MG/DL (ref 0.55–1.02)
DEPRECATED RDW RBC AUTO: 41.7 FL (ref 35.1–46.3)
EOSINOPHIL # BLD AUTO: 0.14 X10(3) UL (ref 0–0.7)
EOSINOPHIL NFR BLD AUTO: 1.3 %
ERYTHROCYTE [DISTWIDTH] IN BLOOD BY AUTOMATED COUNT: 15.6 % (ref 11–15)
GLOBULIN PLAS-MCNC: 3.9 G/DL (ref 2.8–4.4)
GLUCOSE BLD-MCNC: 125 MG/DL (ref 70–99)
HCT VFR BLD AUTO: 44.1 % (ref 35–48)
HGB BLD-MCNC: 15.5 G/DL (ref 12–16)
IMM GRANULOCYTES # BLD AUTO: 0.08 X10(3) UL (ref 0–1)
IMM GRANULOCYTES NFR BLD: 0.8 %
LYMPHOCYTES # BLD AUTO: 2.88 X10(3) UL (ref 1–4)
LYMPHOCYTES NFR BLD AUTO: 27.7 %
M PROTEIN MFR SERPL ELPH: 8.1 G/DL (ref 6.4–8.2)
MCH RBC QN AUTO: 28.9 PG (ref 26–34)
MCHC RBC AUTO-ENTMCNC: 35.1 G/DL (ref 31–37)
MCV RBC AUTO: 82.3 FL (ref 80–100)
MONOCYTES # BLD AUTO: 0.47 X10(3) UL (ref 0.1–1)
MONOCYTES NFR BLD AUTO: 4.5 %
NEUTROPHILS # BLD AUTO: 6.77 X10 (3) UL (ref 1.5–7.7)
NEUTROPHILS # BLD AUTO: 6.77 X10(3) UL (ref 1.5–7.7)
NEUTROPHILS NFR BLD AUTO: 65.3 %
OSMOLALITY SERPL CALC.SUM OF ELEC: 288 MOSM/KG (ref 275–295)
PLATELET # BLD AUTO: 295 10(3)UL (ref 150–450)
POTASSIUM SERPL-SCNC: 4.3 MMOL/L (ref 3.5–5.1)
RBC # BLD AUTO: 5.36 X10(6)UL (ref 3.8–5.3)
SODIUM SERPL-SCNC: 139 MMOL/L (ref 136–145)
WBC # BLD AUTO: 10.4 X10(3) UL (ref 4–11)

## 2019-07-27 PROCEDURE — 96375 TX/PRO/DX INJ NEW DRUG ADDON: CPT

## 2019-07-27 PROCEDURE — 99284 EMERGENCY DEPT VISIT MOD MDM: CPT

## 2019-07-27 PROCEDURE — 80053 COMPREHEN METABOLIC PANEL: CPT | Performed by: EMERGENCY MEDICINE

## 2019-07-27 PROCEDURE — 96376 TX/PRO/DX INJ SAME DRUG ADON: CPT

## 2019-07-27 PROCEDURE — 85025 COMPLETE CBC W/AUTO DIFF WBC: CPT | Performed by: EMERGENCY MEDICINE

## 2019-07-27 PROCEDURE — 70450 CT HEAD/BRAIN W/O DYE: CPT | Performed by: EMERGENCY MEDICINE

## 2019-07-27 PROCEDURE — 96374 THER/PROPH/DIAG INJ IV PUSH: CPT

## 2019-07-27 RX ORDER — HYDROCODONE BITARTRATE AND ACETAMINOPHEN 5; 325 MG/1; MG/1
1-2 TABLET ORAL EVERY 4 HOURS PRN
Qty: 20 TABLET | Refills: 0 | Status: SHIPPED | OUTPATIENT
Start: 2019-07-27 | End: 2019-08-06

## 2019-07-27 RX ORDER — ONDANSETRON 2 MG/ML
4 INJECTION INTRAMUSCULAR; INTRAVENOUS ONCE
Status: COMPLETED | OUTPATIENT
Start: 2019-07-27 | End: 2019-07-27

## 2019-07-27 RX ORDER — MORPHINE SULFATE 4 MG/ML
4 INJECTION, SOLUTION INTRAMUSCULAR; INTRAVENOUS EVERY 30 MIN PRN
Status: DISCONTINUED | OUTPATIENT
Start: 2019-07-27 | End: 2019-07-27

## 2019-07-27 RX ORDER — METHYLPREDNISOLONE 4 MG/1
TABLET ORAL
Qty: 1 PACKAGE | Refills: 0 | Status: SHIPPED | OUTPATIENT
Start: 2019-07-27 | End: 2019-08-01

## 2019-07-27 NOTE — ED INITIAL ASSESSMENT (HPI)
Pt here stating she has stage 3 breast cancer and has been vomiting 1x/d for 1 week. Pt is undergoing chemo daily by taking pills.  Pt stating head pain intermittently with movement and coughing pain is located top of head shooting posteriorly and down spin

## 2019-07-27 NOTE — ED NOTES
Pt stating head pain went down to a 4 but is moving back up . Pt tearful.  at bs.  Denies visual changes

## 2019-07-27 NOTE — ED PROVIDER NOTES
Patient Seen in: [facility] Emergency Department    History   Patient presents with:  Headache (neurologic)    Stated Complaint:     MCKAY Sandoval is a pleasant 18-year-old female presenting to the emergency department for headache.   Patient started except as noted above.     Physical Exam     ED Triage Vitals [07/27/19 0739]   BP (!) 197/98   Pulse 75   Resp 18   Temp 98.7 °F (37.1 °C)   Temp src Temporal   SpO2 96 %   O2 Device None (Room air)       Current:/75   Pulse 89   Temp 98.7 °F (37.1 ° visit.     Follow-up:  Cara Conrad MD  [address] [address]  [phone]    In 2 days          Medications Prescribed:  Current Discharge Medication List    START taking these medications    methylPREDNISolone 4 MG Oral Tablet

## 2019-07-27 NOTE — ED NOTES
Patient is resting comfortably on stretcher snoring intermittently awakens easily .  Lights dimmed  at bs. resps easy nonlabored

## 2020-01-01 ENCOUNTER — APPOINTMENT (OUTPATIENT)
Dept: GENERAL RADIOLOGY | Facility: HOSPITAL | Age: 35
DRG: 194 | End: 2020-01-01
Attending: HOSPITALIST
Payer: COMMERCIAL

## 2020-01-01 ENCOUNTER — HOSPITAL ENCOUNTER (INPATIENT)
Facility: HOSPITAL | Age: 35
LOS: 1 days | Discharge: HOME | DRG: 194 | End: 2020-01-01
Attending: EMERGENCY MEDICINE | Admitting: INTERNAL MEDICINE
Payer: COMMERCIAL

## 2020-01-01 ENCOUNTER — HOSPITAL ENCOUNTER (OUTPATIENT)
Dept: RADIATION ONCOLOGY | Facility: HOSPITAL | Age: 35
Discharge: HOME | End: 2020-01-01
Attending: RADIOLOGY
Payer: COMMERCIAL

## 2020-01-01 ENCOUNTER — DOCUMENTATION ONLY (OUTPATIENT)
Dept: RADIATION ONCOLOGY | Facility: HOSPITAL | Age: 35
End: 2020-01-01

## 2020-01-01 ENCOUNTER — APPOINTMENT (OUTPATIENT)
Dept: CT IMAGING | Facility: HOSPITAL | Age: 35
DRG: 194 | End: 2020-01-01
Attending: EMERGENCY MEDICINE
Payer: COMMERCIAL

## 2020-01-01 ENCOUNTER — APPOINTMENT (OUTPATIENT)
Dept: MRI IMAGING | Facility: HOSPITAL | Age: 35
End: 2020-01-01
Attending: EMERGENCY MEDICINE
Payer: COMMERCIAL

## 2020-01-01 ENCOUNTER — APPOINTMENT (OUTPATIENT)
Dept: GENERAL RADIOLOGY | Facility: HOSPITAL | Age: 35
DRG: 194 | End: 2020-01-01
Attending: EMERGENCY MEDICINE
Payer: COMMERCIAL

## 2020-01-01 ENCOUNTER — HOSPITAL ENCOUNTER (EMERGENCY)
Facility: HOSPITAL | Age: 35
Discharge: HOME | End: 2020-01-01
Attending: EMERGENCY MEDICINE
Payer: COMMERCIAL

## 2020-01-01 ENCOUNTER — APPOINTMENT (OUTPATIENT)
Dept: LAB | Facility: HOSPITAL | Age: 35
End: 2020-01-01
Attending: RADIOLOGY
Payer: COMMERCIAL

## 2020-01-01 VITALS
WEIGHT: 240 LBS | TEMPERATURE: 98 F | DIASTOLIC BLOOD PRESSURE: 82 MMHG | HEART RATE: 99 BPM | HEIGHT: 64 IN | OXYGEN SATURATION: 96 % | RESPIRATION RATE: 18 BRPM | SYSTOLIC BLOOD PRESSURE: 136 MMHG | BODY MASS INDEX: 40.97 KG/M2

## 2020-01-01 VITALS
DIASTOLIC BLOOD PRESSURE: 88 MMHG | WEIGHT: 220.5 LBS | HEIGHT: 64 IN | TEMPERATURE: 98 F | SYSTOLIC BLOOD PRESSURE: 149 MMHG | BODY MASS INDEX: 37.65 KG/M2 | RESPIRATION RATE: 18 BRPM | OXYGEN SATURATION: 93 % | HEART RATE: 101 BPM

## 2020-01-01 VITALS
TEMPERATURE: 98 F | RESPIRATION RATE: 20 BRPM | BODY MASS INDEX: 40.24 KG/M2 | HEART RATE: 105 BPM | HEIGHT: 64.02 IN | OXYGEN SATURATION: 98 % | DIASTOLIC BLOOD PRESSURE: 93 MMHG | WEIGHT: 235.69 LBS | SYSTOLIC BLOOD PRESSURE: 135 MMHG

## 2020-01-01 VITALS
HEART RATE: 100 BPM | RESPIRATION RATE: 18 BRPM | DIASTOLIC BLOOD PRESSURE: 73 MMHG | TEMPERATURE: 98 F | SYSTOLIC BLOOD PRESSURE: 112 MMHG | OXYGEN SATURATION: 100 %

## 2020-01-01 DIAGNOSIS — C79.51 BONE METASTASES (HCC): ICD-10-CM

## 2020-01-01 DIAGNOSIS — C79.51 MALIGNANT NEOPLASM METASTATIC TO LUMBAR SPINE WITH UNKNOWN PRIMARY SITE (HCC): Primary | ICD-10-CM

## 2020-01-01 DIAGNOSIS — C79.51 BONE METASTASES (HCC): Primary | ICD-10-CM

## 2020-01-01 DIAGNOSIS — R06.02 SHORTNESS OF BREATH: Primary | ICD-10-CM

## 2020-01-01 DIAGNOSIS — C80.1 MALIGNANT NEOPLASM METASTATIC TO LUMBAR SPINE WITH UNKNOWN PRIMARY SITE (HCC): Primary | ICD-10-CM

## 2020-01-01 LAB
ALBUMIN SERPL-MCNC: 2.6 G/DL (ref 3.4–5)
ALBUMIN/GLOB SERPL: 0.6 {RATIO} (ref 1–2)
ALP LIVER SERPL-CCNC: 100 U/L
ALT SERPL-CCNC: 85 U/L
ANION GAP SERPL CALC-SCNC: 6 MMOL/L (ref 0–18)
ANION GAP SERPL CALC-SCNC: 7 MMOL/L (ref 0–18)
ANION GAP SERPL CALC-SCNC: 8 MMOL/L (ref 0–18)
APTT PPP: 31.1 SECONDS (ref 25.4–36.1)
ASPERGILLUS GALACTOMANNAN AG, BAL: NEGATIVE
ASPERGILLUS GALACTOMANNAN AG: NEGATIVE
ASPERGILLUS GALACTOMANNAN INDX: 0.03
ASPERGILLUS GALACTOMANNAN INDX: 0.08
AST SERPL-CCNC: 92 U/L (ref 15–37)
ATRIAL RATE: 110 BPM
BASOPHIL BRONCHIAL WASHING: 0 %
BASOPHILS # BLD AUTO: 0.01 X10(3) UL (ref 0–0.2)
BASOPHILS # BLD AUTO: 0.04 X10(3) UL (ref 0–0.2)
BASOPHILS NFR BLD AUTO: 0.2 %
BASOPHILS NFR BLD AUTO: 0.7 %
BILIRUB SERPL-MCNC: 0.5 MG/DL (ref 0.1–2)
BILIRUB UR QL STRIP.AUTO: NEGATIVE
BUN BLD-MCNC: 4 MG/DL (ref 7–18)
BUN BLD-MCNC: 6 MG/DL (ref 7–18)
BUN BLD-MCNC: 8 MG/DL (ref 7–18)
BUN/CREAT SERPL: 13.3 (ref 10–20)
BUN/CREAT SERPL: 13.3 (ref 10–20)
BUN/CREAT SERPL: 18.6 (ref 10–20)
C DIFF TOX B STL QL: NEGATIVE
CALCIUM BLD-MCNC: 9 MG/DL (ref 8.5–10.1)
CALCIUM BLD-MCNC: 9.3 MG/DL (ref 8.5–10.1)
CALCIUM BLD-MCNC: 9.3 MG/DL (ref 8.5–10.1)
CHLORIDE SERPL-SCNC: 102 MMOL/L (ref 98–112)
CHLORIDE SERPL-SCNC: 103 MMOL/L (ref 98–112)
CHLORIDE SERPL-SCNC: 103 MMOL/L (ref 98–112)
CO2 SERPL-SCNC: 25 MMOL/L (ref 21–32)
CO2 SERPL-SCNC: 26 MMOL/L (ref 21–32)
CO2 SERPL-SCNC: 28 MMOL/L (ref 21–32)
CREAT BLD-MCNC: 0.3 MG/DL
CREAT BLD-MCNC: 0.43 MG/DL
CREAT BLD-MCNC: 0.45 MG/DL
CRP SERPL-MCNC: 11.2 MG/DL (ref ?–0.3)
DEPRECATED RDW RBC AUTO: 42.9 FL (ref 35.1–46.3)
DEPRECATED RDW RBC AUTO: 43 FL (ref 35.1–46.3)
DEPRECATED RDW RBC AUTO: 43.3 FL (ref 35.1–46.3)
EOSINOPHIL # BLD AUTO: 0.02 X10(3) UL (ref 0–0.7)
EOSINOPHIL # BLD AUTO: 0.03 X10(3) UL (ref 0–0.7)
EOSINOPHIL BRONCHIAL WASHING: 0 %
EOSINOPHIL NFR BLD AUTO: 0.3 %
EOSINOPHIL NFR BLD AUTO: 0.5 %
ERYTHROCYTE [DISTWIDTH] IN BLOOD BY AUTOMATED COUNT: 14.3 % (ref 11–15)
ERYTHROCYTE [DISTWIDTH] IN BLOOD BY AUTOMATED COUNT: 14.3 % (ref 11–15)
ERYTHROCYTE [DISTWIDTH] IN BLOOD BY AUTOMATED COUNT: 14.4 % (ref 11–15)
GLOBULIN PLAS-MCNC: 4.4 G/DL (ref 2.8–4.4)
GLUCOSE BLD-MCNC: 79 MG/DL (ref 70–99)
GLUCOSE BLD-MCNC: 81 MG/DL (ref 70–99)
GLUCOSE BLD-MCNC: 89 MG/DL (ref 70–99)
GLUCOSE UR STRIP.AUTO-MCNC: NEGATIVE MG/DL
HCG URINE QUALITATIVE: NEGATIVE
HCT VFR BLD AUTO: 31.7 %
HCT VFR BLD AUTO: 31.7 %
HCT VFR BLD AUTO: 32 %
HGB BLD-MCNC: 10.7 G/DL
HGB BLD-MCNC: 10.7 G/DL
HGB BLD-MCNC: 10.8 G/DL
HISTOPLASMA AG DETECTION,URINE: NOT DETECTED
HISTOPLASMA AG EIA, URINE: NOT DETECTED NG/ML
IMM GRANULOCYTES # BLD AUTO: 0.18 X10(3) UL (ref 0–1)
IMM GRANULOCYTES # BLD AUTO: 0.25 X10(3) UL (ref 0–1)
IMM GRANULOCYTES NFR BLD: 3.3 %
IMM GRANULOCYTES NFR BLD: 4.2 %
INR BLD: 1 (ref 0.89–1.11)
LEUKOCYTE ESTERASE UR QL STRIP.AUTO: NEGATIVE
LYMPHOCYTE BRONCHIAL WASHING: 73 %
LYMPHOCYTES # BLD AUTO: 0.66 X10(3) UL (ref 1–4)
LYMPHOCYTES # BLD AUTO: 0.72 X10(3) UL (ref 1–4)
LYMPHOCYTES NFR BLD AUTO: 12 %
LYMPHOCYTES NFR BLD AUTO: 12.2 %
M PROTEIN MFR SERPL ELPH: 7 G/DL (ref 6.4–8.2)
M TB CMPLX RRNA SPEC QL PROBE: NOT DETECTED
MCH RBC QN AUTO: 28.5 PG (ref 26–34)
MCH RBC QN AUTO: 28.5 PG (ref 26–34)
MCH RBC QN AUTO: 28.6 PG (ref 26–34)
MCHC RBC AUTO-ENTMCNC: 33.8 G/DL (ref 31–37)
MCV RBC AUTO: 84.3 FL
MCV RBC AUTO: 84.5 FL
MCV RBC AUTO: 84.7 FL
MON/MACROPHAGE BRONCHIAL WASH: 22 %
MONOCYTES # BLD AUTO: 0.6 X10(3) UL (ref 0.1–1)
MONOCYTES # BLD AUTO: 0.6 X10(3) UL (ref 0.1–1)
MONOCYTES NFR BLD AUTO: 10.2 %
MONOCYTES NFR BLD AUTO: 10.9 %
NEUTROPHILS # BLD AUTO: 4.02 X10 (3) UL (ref 1.5–7.7)
NEUTROPHILS # BLD AUTO: 4.02 X10(3) UL (ref 1.5–7.7)
NEUTROPHILS # BLD AUTO: 4.26 X10 (3) UL (ref 1.5–7.7)
NEUTROPHILS # BLD AUTO: 4.26 X10(3) UL (ref 1.5–7.7)
NEUTROPHILS BRONCHIAL WASHING: 5 %
NEUTROPHILS NFR BLD AUTO: 72.4 %
NEUTROPHILS NFR BLD AUTO: 73.1 %
NITRITE UR QL STRIP.AUTO: NEGATIVE
OSMOLALITY SERPL CALC.SUM OF ELEC: 277 MOSM/KG (ref 275–295)
OSMOLALITY SERPL CALC.SUM OF ELEC: 278 MOSM/KG (ref 275–295)
OSMOLALITY SERPL CALC.SUM OF ELEC: 281 MOSM/KG (ref 275–295)
P AXIS: 32 DEGREES
P-R INTERVAL: 126 MS
PH UR STRIP.AUTO: 6 [PH] (ref 4.5–8)
PLATELET # BLD AUTO: 336 10(3)UL (ref 150–450)
PLATELET # BLD AUTO: 353 10(3)UL (ref 150–450)
PLATELET # BLD AUTO: 377 10(3)UL (ref 150–450)
POTASSIUM SERPL-SCNC: 3.6 MMOL/L (ref 3.5–5.1)
POTASSIUM SERPL-SCNC: 3.8 MMOL/L (ref 3.5–5.1)
POTASSIUM SERPL-SCNC: 3.9 MMOL/L (ref 3.5–5.1)
PROT UR STRIP.AUTO-MCNC: 30 MG/DL
PSA SERPL DL<=0.01 NG/ML-MCNC: 13.5 SECONDS (ref 12.4–14.6)
Q-T INTERVAL: 330 MS
QRS DURATION: 86 MS
QTC CALCULATION (BEZET): 446 MS
R AXIS: 27 DEGREES
RBC # BLD AUTO: 3.75 X10(6)UL
RBC # BLD AUTO: 3.76 X10(6)UL
RBC # BLD AUTO: 3.78 X10(6)UL
RBC # FLD: 150 /MM3
RBC BRONCH FOR MAN CT: 73 /MM3
SARS-COV-2 RNA RESP QL NAA+PROBE: NOT DETECTED
SED RATE-ML: 109 MM/HR
SODIUM SERPL-SCNC: 135 MMOL/L (ref 136–145)
SODIUM SERPL-SCNC: 136 MMOL/L (ref 136–145)
SODIUM SERPL-SCNC: 137 MMOL/L (ref 136–145)
SP GR UR STRIP.AUTO: 1.04 (ref 1–1.03)
T AXIS: 18 DEGREES
TOTAL CELLS COUNTED: 100
TROPONIN I SERPL-MCNC: <0.045 NG/ML (ref ?–0.04)
UROBILINOGEN UR STRIP.AUTO-MCNC: <2 MG/DL
VENTRICULAR RATE: 110 BPM
WBC # BLD AUTO: 5.5 X10(3) UL (ref 4–11)
WBC # BLD AUTO: 5.9 X10(3) UL (ref 4–11)
WBC # BLD AUTO: 6.1 X10(3) UL (ref 4–11)

## 2020-01-01 PROCEDURE — 77307 TELETHX ISODOSE PLAN CPLX: CPT | Performed by: RADIOLOGY

## 2020-01-01 PROCEDURE — A4216 STERILE WATER/SALINE, 10 ML: HCPCS | Performed by: INTERNAL MEDICINE

## 2020-01-01 PROCEDURE — 80048 BASIC METABOLIC PNL TOTAL CA: CPT | Performed by: HOSPITALIST

## 2020-01-01 PROCEDURE — 86140 C-REACTIVE PROTEIN: CPT | Performed by: INTERNAL MEDICINE

## 2020-01-01 PROCEDURE — 77387 GUIDANCE FOR RADJ TX DLVR: CPT | Performed by: RADIOLOGY

## 2020-01-01 PROCEDURE — 77470 SPECIAL RADIATION TREATMENT: CPT | Performed by: RADIOLOGY

## 2020-01-01 PROCEDURE — 77334 RADIATION TREATMENT AID(S): CPT | Performed by: RADIOLOGY

## 2020-01-01 PROCEDURE — 87427 SHIGA-LIKE TOXIN AG IA: CPT | Performed by: HOSPITALIST

## 2020-01-01 PROCEDURE — 80053 COMPREHEN METABOLIC PANEL: CPT | Performed by: EMERGENCY MEDICINE

## 2020-01-01 PROCEDURE — 85027 COMPLETE CBC AUTOMATED: CPT | Performed by: HOSPITALIST

## 2020-01-01 PROCEDURE — 77332 RADIATION TREATMENT AID(S): CPT | Performed by: RADIOLOGY

## 2020-01-01 PROCEDURE — 87496 CYTOMEG DNA AMP PROBE: CPT | Performed by: INTERNAL MEDICINE

## 2020-01-01 PROCEDURE — 87305 ASPERGILLUS AG IA: CPT | Performed by: INTERNAL MEDICINE

## 2020-01-01 PROCEDURE — 88307 TISSUE EXAM BY PATHOLOGIST: CPT | Performed by: INTERNAL MEDICINE

## 2020-01-01 PROCEDURE — 93010 ELECTROCARDIOGRAM REPORT: CPT

## 2020-01-01 PROCEDURE — 77402 RADIATION TX DELIVERY LVL 1: CPT | Performed by: RADIOLOGY

## 2020-01-01 PROCEDURE — 87086 URINE CULTURE/COLONY COUNT: CPT | Performed by: HOSPITALIST

## 2020-01-01 PROCEDURE — 87556 M.TUBERCULO DNA AMP PROBE: CPT | Performed by: INTERNAL MEDICINE

## 2020-01-01 PROCEDURE — 88341 IMHCHEM/IMCYTCHM EA ADD ANTB: CPT | Performed by: INTERNAL MEDICINE

## 2020-01-01 PROCEDURE — 99285 EMERGENCY DEPT VISIT HI MDM: CPT

## 2020-01-01 PROCEDURE — 87206 SMEAR FLUORESCENT/ACID STAI: CPT | Performed by: INTERNAL MEDICINE

## 2020-01-01 PROCEDURE — 99152 MOD SED SAME PHYS/QHP 5/>YRS: CPT | Performed by: INTERNAL MEDICINE

## 2020-01-01 PROCEDURE — 85652 RBC SED RATE AUTOMATED: CPT | Performed by: INTERNAL MEDICINE

## 2020-01-01 PROCEDURE — 71045 X-RAY EXAM CHEST 1 VIEW: CPT | Performed by: EMERGENCY MEDICINE

## 2020-01-01 PROCEDURE — A9575 INJ GADOTERATE MEGLUMI 0.1ML: HCPCS | Performed by: EMERGENCY MEDICINE

## 2020-01-01 PROCEDURE — 87102 FUNGUS ISOLATION CULTURE: CPT | Performed by: INTERNAL MEDICINE

## 2020-01-01 PROCEDURE — 81025 URINE PREGNANCY TEST: CPT | Performed by: HOSPITALIST

## 2020-01-01 PROCEDURE — 87088 URINE BACTERIA CULTURE: CPT | Performed by: HOSPITALIST

## 2020-01-01 PROCEDURE — 89051 BODY FLUID CELL COUNT: CPT | Performed by: INTERNAL MEDICINE

## 2020-01-01 PROCEDURE — 77331 SPECIAL RADIATION DOSIMETRY: CPT | Performed by: RADIOLOGY

## 2020-01-01 PROCEDURE — 88342 IMHCHEM/IMCYTCHM 1ST ANTB: CPT | Performed by: INTERNAL MEDICINE

## 2020-01-01 PROCEDURE — 87045 FECES CULTURE AEROBIC BACT: CPT | Performed by: HOSPITALIST

## 2020-01-01 PROCEDURE — 77336 RADIATION PHYSICS CONSULT: CPT | Performed by: RADIOLOGY

## 2020-01-01 PROCEDURE — 77290 THER RAD SIMULAJ FIELD CPLX: CPT | Performed by: RADIOLOGY

## 2020-01-01 PROCEDURE — 87186 SC STD MICRODIL/AGAR DIL: CPT | Performed by: HOSPITALIST

## 2020-01-01 PROCEDURE — 85025 COMPLETE CBC W/AUTO DIFF WBC: CPT | Performed by: EMERGENCY MEDICINE

## 2020-01-01 PROCEDURE — 87046 STOOL CULTR AEROBIC BACT EA: CPT | Performed by: HOSPITALIST

## 2020-01-01 PROCEDURE — 87116 MYCOBACTERIA CULTURE: CPT | Performed by: INTERNAL MEDICINE

## 2020-01-01 PROCEDURE — 87071 CULTURE AEROBIC QUANT OTHER: CPT | Performed by: INTERNAL MEDICINE

## 2020-01-01 PROCEDURE — 87493 C DIFF AMPLIFIED PROBE: CPT | Performed by: INTERNAL MEDICINE

## 2020-01-01 PROCEDURE — 81001 URINALYSIS AUTO W/SCOPE: CPT | Performed by: HOSPITALIST

## 2020-01-01 PROCEDURE — 87798 DETECT AGENT NOS DNA AMP: CPT | Performed by: INTERNAL MEDICINE

## 2020-01-01 PROCEDURE — 88360 TUMOR IMMUNOHISTOCHEM/MANUAL: CPT | Performed by: INTERNAL MEDICINE

## 2020-01-01 PROCEDURE — 89050 BODY FLUID CELL COUNT: CPT | Performed by: INTERNAL MEDICINE

## 2020-01-01 PROCEDURE — 99284 EMERGENCY DEPT VISIT MOD MDM: CPT

## 2020-01-01 PROCEDURE — 88104 CYTOPATH FL NONGYN SMEARS: CPT | Performed by: INTERNAL MEDICINE

## 2020-01-01 PROCEDURE — 93005 ELECTROCARDIOGRAM TRACING: CPT

## 2020-01-01 PROCEDURE — 85730 THROMBOPLASTIN TIME PARTIAL: CPT | Performed by: EMERGENCY MEDICINE

## 2020-01-01 PROCEDURE — 84484 ASSAY OF TROPONIN QUANT: CPT | Performed by: EMERGENCY MEDICINE

## 2020-01-01 PROCEDURE — 36593 DECLOT VASCULAR DEVICE: CPT

## 2020-01-01 PROCEDURE — 85025 COMPLETE CBC W/AUTO DIFF WBC: CPT | Performed by: HOSPITALIST

## 2020-01-01 PROCEDURE — 71045 X-RAY EXAM CHEST 1 VIEW: CPT | Performed by: HOSPITALIST

## 2020-01-01 PROCEDURE — 74018 RADEX ABDOMEN 1 VIEW: CPT | Performed by: HOSPITALIST

## 2020-01-01 PROCEDURE — 71275 CT ANGIOGRAPHY CHEST: CPT | Performed by: EMERGENCY MEDICINE

## 2020-01-01 PROCEDURE — 0B9G8ZX DRAINAGE OF LEFT UPPER LUNG LOBE, VIA NATURAL OR ARTIFICIAL OPENING ENDOSCOPIC, DIAGNOSTIC: ICD-10-PCS | Performed by: INTERNAL MEDICINE

## 2020-01-01 PROCEDURE — 87205 SMEAR GRAM STAIN: CPT | Performed by: INTERNAL MEDICINE

## 2020-01-01 PROCEDURE — 88305 TISSUE EXAM BY PATHOLOGIST: CPT | Performed by: INTERNAL MEDICINE

## 2020-01-01 PROCEDURE — 87385 HISTOPLASMA CAPSUL AG IA: CPT | Performed by: INTERNAL MEDICINE

## 2020-01-01 PROCEDURE — 85610 PROTHROMBIN TIME: CPT | Performed by: EMERGENCY MEDICINE

## 2020-01-01 PROCEDURE — 96374 THER/PROPH/DIAG INJ IV PUSH: CPT

## 2020-01-01 PROCEDURE — 77280 THER RAD SIMULAJ FIELD SMPL: CPT | Performed by: RADIOLOGY

## 2020-01-01 PROCEDURE — 96375 TX/PRO/DX INJ NEW DRUG ADDON: CPT

## 2020-01-01 PROCEDURE — 72158 MRI LUMBAR SPINE W/O & W/DYE: CPT | Performed by: EMERGENCY MEDICINE

## 2020-01-01 RX ORDER — FENTANYL 50 UG/H
1 PATCH TRANSDERMAL
Status: DISCONTINUED | OUTPATIENT
Start: 2020-01-01 | End: 2020-01-01

## 2020-01-01 RX ORDER — GARLIC EXTRACT 500 MG
1 CAPSULE ORAL DAILY
Status: DISCONTINUED | OUTPATIENT
Start: 2020-01-01 | End: 2020-01-01

## 2020-01-01 RX ORDER — LIDOCAINE AND PRILOCAINE 25; 25 MG/G; MG/G
CREAM TOPICAL ONCE
Status: COMPLETED | OUTPATIENT
Start: 2020-01-01 | End: 2020-01-01

## 2020-01-01 RX ORDER — LORAZEPAM 1 MG/1
1 TABLET ORAL 2 TIMES DAILY PRN
Status: DISCONTINUED | OUTPATIENT
Start: 2020-01-01 | End: 2020-01-01

## 2020-01-01 RX ORDER — HYDROCODONE BITARTRATE AND ACETAMINOPHEN 5; 325 MG/1; MG/1
2 TABLET ORAL EVERY 4 HOURS PRN
Status: DISCONTINUED | OUTPATIENT
Start: 2020-01-01 | End: 2020-01-01

## 2020-01-01 RX ORDER — DEXAMETHASONE SODIUM PHOSPHATE 4 MG/ML
10 VIAL (ML) INJECTION ONCE
Status: COMPLETED | OUTPATIENT
Start: 2020-01-01 | End: 2020-01-01

## 2020-01-01 RX ORDER — ESCITALOPRAM OXALATE 20 MG/1
20 TABLET ORAL DAILY
Status: DISCONTINUED | OUTPATIENT
Start: 2020-01-01 | End: 2020-01-01

## 2020-01-01 RX ORDER — HYDROCODONE BITARTRATE AND ACETAMINOPHEN 5; 325 MG/1; MG/1
2 TABLET ORAL ONCE
Status: COMPLETED | OUTPATIENT
Start: 2020-01-01 | End: 2020-01-01

## 2020-01-01 RX ORDER — PROCHLORPERAZINE EDISYLATE 5 MG/ML
5 INJECTION INTRAMUSCULAR; INTRAVENOUS EVERY 6 HOURS PRN
Status: DISCONTINUED | OUTPATIENT
Start: 2020-01-01 | End: 2020-01-01

## 2020-01-01 RX ORDER — LIDOCAINE AND PRILOCAINE 25; 25 MG/G; MG/G
CREAM TOPICAL ONCE
Status: DISCONTINUED | OUTPATIENT
Start: 2020-01-01 | End: 2020-01-01

## 2020-01-01 RX ORDER — PREDNISONE 20 MG/1
40 TABLET ORAL DAILY
Qty: 20 TABLET | Refills: 0 | Status: SHIPPED | OUTPATIENT
Start: 2020-01-01 | End: 2021-01-01

## 2020-01-01 RX ORDER — POLYETHYLENE GLYCOL 3350 17 G/17G
17 POWDER, FOR SOLUTION ORAL DAILY PRN
Status: DISCONTINUED | OUTPATIENT
Start: 2020-01-01 | End: 2020-01-01

## 2020-01-01 RX ORDER — PREDNISONE 20 MG/1
40 TABLET ORAL DAILY
Qty: 20 TABLET | Refills: 0 | Status: SHIPPED
Start: 2020-01-01 | End: 2020-01-01

## 2020-01-01 RX ORDER — HYDROCODONE BITARTRATE AND ACETAMINOPHEN 5; 325 MG/1; MG/1
1-2 TABLET ORAL EVERY 6 HOURS PRN
Qty: 20 TABLET | Refills: 0 | Status: SHIPPED | OUTPATIENT
Start: 2020-01-01 | End: 2020-01-01

## 2020-01-01 RX ORDER — SODIUM PHOSPHATE, DIBASIC AND SODIUM PHOSPHATE, MONOBASIC 7; 19 G/133ML; G/133ML
1 ENEMA RECTAL ONCE AS NEEDED
Status: DISCONTINUED | OUTPATIENT
Start: 2020-01-01 | End: 2020-01-01

## 2020-01-01 RX ORDER — MIDAZOLAM HYDROCHLORIDE 1 MG/ML
INJECTION INTRAMUSCULAR; INTRAVENOUS
Status: DISCONTINUED | OUTPATIENT
Start: 2020-01-01 | End: 2020-01-01 | Stop reason: HOSPADM

## 2020-01-01 RX ORDER — HYDROMORPHONE HYDROCHLORIDE 1 MG/ML
1 INJECTION, SOLUTION INTRAMUSCULAR; INTRAVENOUS; SUBCUTANEOUS EVERY 30 MIN PRN
Status: DISCONTINUED | OUTPATIENT
Start: 2020-01-01 | End: 2020-01-01

## 2020-01-01 RX ORDER — LOPERAMIDE HYDROCHLORIDE 2 MG/1
2 CAPSULE ORAL 4 TIMES DAILY PRN
Status: DISCONTINUED | OUTPATIENT
Start: 2020-01-01 | End: 2020-01-01

## 2020-01-01 RX ORDER — ENOXAPARIN SODIUM 100 MG/ML
40 INJECTION SUBCUTANEOUS DAILY
Status: DISCONTINUED | OUTPATIENT
Start: 2020-01-01 | End: 2020-01-01

## 2020-01-01 RX ORDER — ONDANSETRON 2 MG/ML
INJECTION INTRAMUSCULAR; INTRAVENOUS
Status: COMPLETED
Start: 2020-01-01 | End: 2020-01-01

## 2020-01-01 RX ORDER — ONDANSETRON 2 MG/ML
4 INJECTION INTRAMUSCULAR; INTRAVENOUS EVERY 6 HOURS PRN
Status: DISCONTINUED | OUTPATIENT
Start: 2020-01-01 | End: 2020-01-01

## 2020-01-01 RX ORDER — HYDROCODONE BITARTRATE AND ACETAMINOPHEN 5; 325 MG/1; MG/1
1 TABLET ORAL EVERY 4 HOURS PRN
Status: DISCONTINUED | OUTPATIENT
Start: 2020-01-01 | End: 2020-01-01

## 2020-01-01 RX ORDER — LORAZEPAM 2 MG/ML
1 INJECTION INTRAMUSCULAR ONCE
Status: COMPLETED | OUTPATIENT
Start: 2020-01-01 | End: 2020-01-01

## 2020-01-01 RX ORDER — BISACODYL 10 MG
10 SUPPOSITORY, RECTAL RECTAL
Status: DISCONTINUED | OUTPATIENT
Start: 2020-01-01 | End: 2020-01-01

## 2020-01-01 RX ORDER — ONDANSETRON 2 MG/ML
4 INJECTION INTRAMUSCULAR; INTRAVENOUS EVERY 6 HOURS PRN
Status: DISCONTINUED | OUTPATIENT
Start: 2020-01-01 | End: 2020-01-01 | Stop reason: HOSPADM

## 2020-01-01 RX ORDER — ACETAMINOPHEN 325 MG/1
650 TABLET ORAL EVERY 4 HOURS PRN
Status: DISCONTINUED | OUTPATIENT
Start: 2020-01-01 | End: 2020-01-01

## 2020-02-04 NOTE — PROGRESS NOTES
Nursing Consultation Note  Patient: Francisco Hamilton  YOB: 1985  Age: 36year old  Radiation Oncologist: Dr. Gt Alvarez  Referring Physician: Juju Zapien, Dr. Prashanth Jones,   Diagnosis: RIGHT BREAST CANCER  Consult Date: 2/5/2020      Chemo ovarian suppression. Started chemo in October 2018 - March 2019. Pt then had R mastectomy done on 4/25/19 at [facility] with Dr. Quinten Day. Path showed R breast with IDC, gr 3 (45mm) with 1/11 +R axillary LN, margins clear and no LVI.  Pt then proceeded with Xeloda, (Patient not taking: Reported on 2/5/2020 ) 30 tablet 3   • Ondansetron HCl (ZOFRAN) 8 MG tablet Take 1 tablet (8 mg total) by mouth every 8 (eight) hours as needed for Nausea.  (Patient not taking: Reported on 2/5/2020 ) 20 tablet 3   • HYDROcodone-acetami Laterality Date   • ACCESS PORT     • BREAST BIOPSY     •   2017   •  SECTION N/A 2017    Performed by Colleen Rosales MD at [facility] OR   • INSERTION PORT-A-CATHETER N/A 10/16/2018    Performed by Felisa Gaytan MD at organizations: Not on file        Relationship status: Not on file      Intimate partner violence:        Fear of current or ex partner: Not on file        Emotionally abused: Not on file        Physically abused: Not on file        Forced sexual activity:

## 2020-02-05 ENCOUNTER — HOSPITAL ENCOUNTER (OUTPATIENT)
Dept: RADIATION ONCOLOGY | Age: 35
Discharge: HOME | End: 2020-02-05
Attending: RADIOLOGY
Payer: COMMERCIAL

## 2020-02-05 VITALS
OXYGEN SATURATION: 96 % | TEMPERATURE: 98 F | WEIGHT: 249.81 LBS | SYSTOLIC BLOOD PRESSURE: 125 MMHG | RESPIRATION RATE: 20 BRPM | BODY MASS INDEX: 42.65 KG/M2 | DIASTOLIC BLOOD PRESSURE: 84 MMHG | HEIGHT: 64 IN | HEART RATE: 88 BPM

## 2020-02-05 DIAGNOSIS — Z17.1 MALIGNANT NEOPLASM OF OVERLAPPING SITES OF RIGHT BREAST IN FEMALE, ESTROGEN RECEPTOR NEGATIVE (HCC): ICD-10-CM

## 2020-02-05 DIAGNOSIS — C50.811 MALIGNANT NEOPLASM OF OVERLAPPING SITES OF RIGHT BREAST IN FEMALE, ESTROGEN RECEPTOR NEGATIVE (HCC): ICD-10-CM

## 2020-02-05 PROBLEM — Z90.11 S/P MASTECTOMY, RIGHT: Status: ACTIVE | Noted: 2019-04-25

## 2020-02-05 PROCEDURE — 99214 OFFICE O/P EST MOD 30 MIN: CPT

## 2020-02-05 RX ORDER — MOMETASONE FUROATE 1 MG/G
CREAM TOPICAL
Qty: 45 G | Refills: 3 | Status: SHIPPED | OUTPATIENT
Start: 2020-02-05 | End: 2020-01-01 | Stop reason: ALTCHOICE

## 2020-02-05 NOTE — PATIENT INSTRUCTIONS
- CT SIMULATION/MAPPING SCHEDULED FOR Friday 2/7 AT 3:15 PM     - IF YOU HAVE ANY QUESTIONS OR CONCERNS REGARDING RADIATION THERAPY, PLEASE CALL [phone]

## 2020-02-05 NOTE — CONSULTS
[facility]     PATIENT:   Mark Singh      11/20/1985    REFERRING MD:  Nando Jacobs MD  DIAGNOSIS:   cT3 N1 M0, ypT2 N1a M0      R UOQ/UIQ breast cancer      Triple negative   CC:    R breast CA    HPI   37 yo 11 LYMPH NODES. (1/11)  (1.9 mm residual deposit)       - PREVIOUS BIOPSY SITE CHANGE.       - EXTRACAPSULAR EXTENSION PRESENT. 4 cycles of Xeloda, stopped Sep 2019 b/c of insurance issue. Returned to Dr. Yefri Linda in Dec 2019.   No further Xeloda or Lupron

## 2020-02-07 ENCOUNTER — HOSPITAL ENCOUNTER (OUTPATIENT)
Dept: RADIATION ONCOLOGY | Age: 35
Discharge: HOME | End: 2020-02-07
Attending: RADIOLOGY
Payer: COMMERCIAL

## 2020-02-07 PROCEDURE — 77290 THER RAD SIMULAJ FIELD CPLX: CPT | Performed by: RADIOLOGY

## 2020-02-07 PROCEDURE — 77334 RADIATION TREATMENT AID(S): CPT | Performed by: RADIOLOGY

## 2020-02-12 PROCEDURE — 77300 RADIATION THERAPY DOSE PLAN: CPT | Performed by: RADIOLOGY

## 2020-02-12 PROCEDURE — 77334 RADIATION TREATMENT AID(S): CPT | Performed by: RADIOLOGY

## 2020-02-12 PROCEDURE — 77295 3-D RADIOTHERAPY PLAN: CPT | Performed by: RADIOLOGY

## 2020-02-14 NOTE — PROGRESS NOTES
[facility] Radiation Treatment Management Note 1-5    Patient:  Juliocesar Marquez  Age:  36year old  Visit Diagnosis:    1.  Malignant neoplasm of overlapping sites of right breast in female, estrogen receptor negative ([facility] [address]      Prima

## 2020-02-17 ENCOUNTER — HOSPITAL ENCOUNTER (OUTPATIENT)
Dept: RADIATION ONCOLOGY | Age: 35
Discharge: HOME | End: 2020-02-17
Attending: RADIOLOGY
Payer: COMMERCIAL

## 2020-02-17 DIAGNOSIS — Z17.1 MALIGNANT NEOPLASM OF OVERLAPPING SITES OF RIGHT BREAST IN FEMALE, ESTROGEN RECEPTOR NEGATIVE (HCC): Primary | ICD-10-CM

## 2020-02-17 DIAGNOSIS — C50.811 MALIGNANT NEOPLASM OF OVERLAPPING SITES OF RIGHT BREAST IN FEMALE, ESTROGEN RECEPTOR NEGATIVE (HCC): Primary | ICD-10-CM

## 2020-02-17 PROCEDURE — 77280 THER RAD SIMULAJ FIELD SMPL: CPT | Performed by: RADIOLOGY

## 2020-02-17 PROCEDURE — 77300 RADIATION THERAPY DOSE PLAN: CPT | Performed by: RADIOLOGY

## 2020-02-17 PROCEDURE — 77412 RADIATION TX DELIVERY LVL 3: CPT | Performed by: RADIOLOGY

## 2020-02-17 PROCEDURE — 77334 RADIATION TREATMENT AID(S): CPT | Performed by: RADIOLOGY

## 2020-02-18 ENCOUNTER — HOSPITAL ENCOUNTER (OUTPATIENT)
Dept: RADIATION ONCOLOGY | Age: 35
Discharge: HOME | End: 2020-02-18
Attending: RADIOLOGY
Payer: COMMERCIAL

## 2020-02-18 PROCEDURE — 77412 RADIATION TX DELIVERY LVL 3: CPT | Performed by: RADIOLOGY

## 2020-02-18 PROCEDURE — 77290 THER RAD SIMULAJ FIELD CPLX: CPT | Performed by: RADIOLOGY

## 2020-02-19 PROCEDURE — 77412 RADIATION TX DELIVERY LVL 3: CPT | Performed by: RADIOLOGY

## 2020-02-19 PROCEDURE — 77331 SPECIAL RADIATION DOSIMETRY: CPT | Performed by: RADIOLOGY

## 2020-02-19 PROCEDURE — 77387 GUIDANCE FOR RADJ TX DLVR: CPT | Performed by: RADIOLOGY

## 2020-02-20 PROCEDURE — 77412 RADIATION TX DELIVERY LVL 3: CPT | Performed by: RADIOLOGY

## 2020-02-20 PROCEDURE — 77387 GUIDANCE FOR RADJ TX DLVR: CPT | Performed by: RADIOLOGY

## 2020-02-21 PROCEDURE — 77387 GUIDANCE FOR RADJ TX DLVR: CPT | Performed by: RADIOLOGY

## 2020-02-21 PROCEDURE — 77412 RADIATION TX DELIVERY LVL 3: CPT | Performed by: RADIOLOGY

## 2020-02-24 ENCOUNTER — HOSPITAL ENCOUNTER (OUTPATIENT)
Dept: RADIATION ONCOLOGY | Age: 35
Discharge: HOME | End: 2020-02-24
Attending: RADIOLOGY
Payer: COMMERCIAL

## 2020-02-24 VITALS
HEART RATE: 85 BPM | BODY MASS INDEX: 42 KG/M2 | OXYGEN SATURATION: 96 % | TEMPERATURE: 98 F | RESPIRATION RATE: 20 BRPM | WEIGHT: 245.81 LBS

## 2020-02-24 DIAGNOSIS — C50.811 MALIGNANT NEOPLASM OF OVERLAPPING SITES OF RIGHT BREAST IN FEMALE, ESTROGEN RECEPTOR NEGATIVE (HCC): Primary | ICD-10-CM

## 2020-02-24 DIAGNOSIS — Z17.1 MALIGNANT NEOPLASM OF OVERLAPPING SITES OF RIGHT BREAST IN FEMALE, ESTROGEN RECEPTOR NEGATIVE (HCC): Primary | ICD-10-CM

## 2020-02-24 PROCEDURE — 77412 RADIATION TX DELIVERY LVL 3: CPT | Performed by: RADIOLOGY

## 2020-02-24 PROCEDURE — 77387 GUIDANCE FOR RADJ TX DLVR: CPT | Performed by: RADIOLOGY

## 2020-02-24 NOTE — PROGRESS NOTES
[facility] Radiation Treatment Management Note 6-10    Patient:  Ally Sheets  Age:  36year old  Visit Diagnosis:  R breast CA, multicentric, s/p neoadj chemo, ypN1 disease, TN  Primary Rad/Onc:  Dr. Larry Mcclain

## 2020-02-25 PROCEDURE — 77412 RADIATION TX DELIVERY LVL 3: CPT | Performed by: RADIOLOGY

## 2020-02-25 PROCEDURE — 77387 GUIDANCE FOR RADJ TX DLVR: CPT | Performed by: RADIOLOGY

## 2020-02-26 PROCEDURE — 77387 GUIDANCE FOR RADJ TX DLVR: CPT | Performed by: RADIOLOGY

## 2020-02-26 PROCEDURE — 77412 RADIATION TX DELIVERY LVL 3: CPT | Performed by: RADIOLOGY

## 2020-02-27 PROCEDURE — 77387 GUIDANCE FOR RADJ TX DLVR: CPT | Performed by: RADIOLOGY

## 2020-02-27 PROCEDURE — 77412 RADIATION TX DELIVERY LVL 3: CPT | Performed by: RADIOLOGY

## 2020-02-28 PROCEDURE — 77336 RADIATION PHYSICS CONSULT: CPT | Performed by: RADIOLOGY

## 2020-02-28 PROCEDURE — 77412 RADIATION TX DELIVERY LVL 3: CPT | Performed by: RADIOLOGY

## 2020-02-28 PROCEDURE — 77387 GUIDANCE FOR RADJ TX DLVR: CPT | Performed by: RADIOLOGY

## 2020-03-01 ENCOUNTER — HOSPITAL ENCOUNTER (OUTPATIENT)
Dept: RADIATION ONCOLOGY | Age: 35
Discharge: HOME | End: 2020-03-01
Attending: RADIOLOGY
Payer: COMMERCIAL

## 2020-03-02 ENCOUNTER — HOSPITAL ENCOUNTER (OUTPATIENT)
Dept: RADIATION ONCOLOGY | Age: 35
Discharge: HOME | End: 2020-03-02
Attending: RADIOLOGY
Payer: COMMERCIAL

## 2020-03-02 VITALS
RESPIRATION RATE: 20 BRPM | HEART RATE: 86 BPM | DIASTOLIC BLOOD PRESSURE: 87 MMHG | BODY MASS INDEX: 43 KG/M2 | TEMPERATURE: 98 F | WEIGHT: 249.69 LBS | OXYGEN SATURATION: 99 % | SYSTOLIC BLOOD PRESSURE: 134 MMHG

## 2020-03-02 DIAGNOSIS — Z17.1 MALIGNANT NEOPLASM OF OVERLAPPING SITES OF RIGHT BREAST IN FEMALE, ESTROGEN RECEPTOR NEGATIVE (HCC): Primary | ICD-10-CM

## 2020-03-02 DIAGNOSIS — C50.811 MALIGNANT NEOPLASM OF OVERLAPPING SITES OF RIGHT BREAST IN FEMALE, ESTROGEN RECEPTOR NEGATIVE (HCC): Primary | ICD-10-CM

## 2020-03-02 PROCEDURE — 77412 RADIATION TX DELIVERY LVL 3: CPT | Performed by: RADIOLOGY

## 2020-03-02 PROCEDURE — 77387 GUIDANCE FOR RADJ TX DLVR: CPT | Performed by: RADIOLOGY

## 2020-03-02 NOTE — PROGRESS NOTES
[facility] Radiation Treatment Management Note 11-15    Patient:  Ally Sheets  Age:  36year old  Visit Diagnosis:  R breast CA, multicentric, s/p neoadj chemo, ypN1 disease, TN  Primary Rad/Onc:  Dr. Chavez Kriss

## 2020-03-03 PROCEDURE — 77412 RADIATION TX DELIVERY LVL 3: CPT | Performed by: RADIOLOGY

## 2020-03-03 PROCEDURE — 77387 GUIDANCE FOR RADJ TX DLVR: CPT | Performed by: RADIOLOGY

## 2020-03-04 PROCEDURE — 77387 GUIDANCE FOR RADJ TX DLVR: CPT | Performed by: RADIOLOGY

## 2020-03-04 PROCEDURE — 77412 RADIATION TX DELIVERY LVL 3: CPT | Performed by: RADIOLOGY

## 2020-03-05 PROCEDURE — 77412 RADIATION TX DELIVERY LVL 3: CPT | Performed by: RADIOLOGY

## 2020-03-05 PROCEDURE — 77387 GUIDANCE FOR RADJ TX DLVR: CPT | Performed by: RADIOLOGY

## 2020-03-06 PROCEDURE — 77336 RADIATION PHYSICS CONSULT: CPT | Performed by: RADIOLOGY

## 2020-03-06 PROCEDURE — 77412 RADIATION TX DELIVERY LVL 3: CPT | Performed by: RADIOLOGY

## 2020-03-06 PROCEDURE — 77387 GUIDANCE FOR RADJ TX DLVR: CPT | Performed by: RADIOLOGY

## 2020-03-13 PROCEDURE — 77336 RADIATION PHYSICS CONSULT: CPT | Performed by: RADIOLOGY

## 2020-03-16 ENCOUNTER — HOSPITAL ENCOUNTER (OUTPATIENT)
Dept: RADIATION ONCOLOGY | Age: 35
Discharge: HOME | End: 2020-03-16
Attending: RADIOLOGY
Payer: COMMERCIAL

## 2020-03-16 VITALS
OXYGEN SATURATION: 99 % | DIASTOLIC BLOOD PRESSURE: 81 MMHG | TEMPERATURE: 98 F | RESPIRATION RATE: 20 BRPM | BODY MASS INDEX: 42 KG/M2 | WEIGHT: 246.38 LBS | SYSTOLIC BLOOD PRESSURE: 162 MMHG | HEART RATE: 82 BPM

## 2020-03-16 PROCEDURE — 77412 RADIATION TX DELIVERY LVL 3: CPT | Performed by: RADIOLOGY

## 2020-03-16 PROCEDURE — 77387 GUIDANCE FOR RADJ TX DLVR: CPT | Performed by: RADIOLOGY

## 2020-03-16 NOTE — PROGRESS NOTES
[facility] Radiation Treatment Management Note 16-20    Patient:  Jose Roldan  Age:  36year old  Visit Diagnosis:  R breast CA, multicentric, s/p neoadj chemo, ypN1 disease, TN  Primary Rad/Onc:  Dr. Lana Bailon

## 2020-03-17 PROCEDURE — 77412 RADIATION TX DELIVERY LVL 3: CPT | Performed by: RADIOLOGY

## 2020-03-17 PROCEDURE — 77387 GUIDANCE FOR RADJ TX DLVR: CPT | Performed by: RADIOLOGY

## 2020-03-18 PROCEDURE — 77412 RADIATION TX DELIVERY LVL 3: CPT | Performed by: RADIOLOGY

## 2020-03-18 PROCEDURE — 77387 GUIDANCE FOR RADJ TX DLVR: CPT | Performed by: RADIOLOGY

## 2020-03-19 PROCEDURE — 77412 RADIATION TX DELIVERY LVL 3: CPT | Performed by: RADIOLOGY

## 2020-03-19 PROCEDURE — 77387 GUIDANCE FOR RADJ TX DLVR: CPT | Performed by: RADIOLOGY

## 2020-03-20 PROCEDURE — 77387 GUIDANCE FOR RADJ TX DLVR: CPT | Performed by: RADIOLOGY

## 2020-03-20 PROCEDURE — 77412 RADIATION TX DELIVERY LVL 3: CPT | Performed by: RADIOLOGY

## 2020-03-23 ENCOUNTER — HOSPITAL ENCOUNTER (OUTPATIENT)
Dept: RADIATION ONCOLOGY | Age: 35
Discharge: HOME | End: 2020-03-23
Attending: RADIOLOGY
Payer: COMMERCIAL

## 2020-03-23 VITALS
BODY MASS INDEX: 42 KG/M2 | SYSTOLIC BLOOD PRESSURE: 134 MMHG | TEMPERATURE: 98 F | DIASTOLIC BLOOD PRESSURE: 84 MMHG | WEIGHT: 243.31 LBS | RESPIRATION RATE: 20 BRPM | HEART RATE: 74 BPM | OXYGEN SATURATION: 98 %

## 2020-03-23 DIAGNOSIS — C50.811 MALIGNANT NEOPLASM OF OVERLAPPING SITES OF RIGHT BREAST IN FEMALE, ESTROGEN RECEPTOR NEGATIVE (HCC): Primary | ICD-10-CM

## 2020-03-23 DIAGNOSIS — Z17.1 MALIGNANT NEOPLASM OF OVERLAPPING SITES OF RIGHT BREAST IN FEMALE, ESTROGEN RECEPTOR NEGATIVE (HCC): Primary | ICD-10-CM

## 2020-03-23 PROCEDURE — 77387 GUIDANCE FOR RADJ TX DLVR: CPT | Performed by: RADIOLOGY

## 2020-03-23 PROCEDURE — 77412 RADIATION TX DELIVERY LVL 3: CPT | Performed by: RADIOLOGY

## 2020-03-23 NOTE — PROGRESS NOTES
[facility] Radiation Treatment Management Note 21-25    Patient:  Derrell Cavazos  Age:  36year old  Visit Diagnosis:  R breast CA, multicentric, s/p neoadj chemo, ypN1 disease, TN  Primary Rad/Onc:  Dr. Ruthann Mera

## 2020-03-24 PROCEDURE — 77412 RADIATION TX DELIVERY LVL 3: CPT | Performed by: RADIOLOGY

## 2020-03-24 PROCEDURE — 77387 GUIDANCE FOR RADJ TX DLVR: CPT | Performed by: RADIOLOGY

## 2020-03-25 PROCEDURE — 77387 GUIDANCE FOR RADJ TX DLVR: CPT | Performed by: RADIOLOGY

## 2020-03-25 PROCEDURE — 77412 RADIATION TX DELIVERY LVL 3: CPT | Performed by: RADIOLOGY

## 2020-03-26 PROCEDURE — 77412 RADIATION TX DELIVERY LVL 3: CPT | Performed by: RADIOLOGY

## 2020-03-26 PROCEDURE — 77387 GUIDANCE FOR RADJ TX DLVR: CPT | Performed by: RADIOLOGY

## 2020-03-27 PROCEDURE — 77387 GUIDANCE FOR RADJ TX DLVR: CPT | Performed by: RADIOLOGY

## 2020-03-27 PROCEDURE — 77412 RADIATION TX DELIVERY LVL 3: CPT | Performed by: RADIOLOGY

## 2020-03-27 PROCEDURE — 77336 RADIATION PHYSICS CONSULT: CPT | Performed by: RADIOLOGY

## 2020-03-30 ENCOUNTER — HOSPITAL ENCOUNTER (OUTPATIENT)
Dept: RADIATION ONCOLOGY | Age: 35
Discharge: HOME | End: 2020-03-30
Attending: RADIOLOGY
Payer: COMMERCIAL

## 2020-03-30 VITALS
SYSTOLIC BLOOD PRESSURE: 124 MMHG | HEART RATE: 79 BPM | WEIGHT: 243.13 LBS | RESPIRATION RATE: 20 BRPM | BODY MASS INDEX: 42 KG/M2 | OXYGEN SATURATION: 97 % | TEMPERATURE: 98 F | DIASTOLIC BLOOD PRESSURE: 84 MMHG

## 2020-03-30 DIAGNOSIS — C50.811 MALIGNANT NEOPLASM OF OVERLAPPING SITES OF RIGHT BREAST IN FEMALE, ESTROGEN RECEPTOR NEGATIVE (HCC): Primary | ICD-10-CM

## 2020-03-30 DIAGNOSIS — Z17.1 MALIGNANT NEOPLASM OF OVERLAPPING SITES OF RIGHT BREAST IN FEMALE, ESTROGEN RECEPTOR NEGATIVE (HCC): Primary | ICD-10-CM

## 2020-03-30 PROCEDURE — 77412 RADIATION TX DELIVERY LVL 3: CPT | Performed by: RADIOLOGY

## 2020-03-30 PROCEDURE — 77387 GUIDANCE FOR RADJ TX DLVR: CPT | Performed by: RADIOLOGY

## 2020-03-30 NOTE — PROGRESS NOTES
[facility] Radiation Treatment Management Note 26-30    Patient:  Mark Singh  Age:  36year old  Visit Diagnosis:  R breast CA, multicentric, s/p neoadj chemo, ypN1 disease, TN  Primary Rad/Onc:  Dr. Hortencia Ferreira

## 2020-04-01 ENCOUNTER — APPOINTMENT (OUTPATIENT)
Dept: RADIATION ONCOLOGY | Age: 35
End: 2020-04-01
Attending: RADIOLOGY
Payer: COMMERCIAL

## 2020-04-01 ENCOUNTER — HOSPITAL ENCOUNTER (OUTPATIENT)
Dept: RADIATION ONCOLOGY | Age: 35
Discharge: HOME | End: 2020-04-01
Attending: RADIOLOGY
Payer: COMMERCIAL

## 2020-04-01 PROCEDURE — 77321 SPECIAL TELETX PORT PLAN: CPT | Performed by: RADIOLOGY

## 2020-04-01 PROCEDURE — 77334 RADIATION TREATMENT AID(S): CPT | Performed by: RADIOLOGY

## 2020-04-01 PROCEDURE — 77290 THER RAD SIMULAJ FIELD CPLX: CPT | Performed by: RADIOLOGY

## 2020-04-02 PROCEDURE — 77412 RADIATION TX DELIVERY LVL 3: CPT | Performed by: RADIOLOGY

## 2020-04-02 PROCEDURE — 77387 GUIDANCE FOR RADJ TX DLVR: CPT | Performed by: RADIOLOGY

## 2020-04-03 ENCOUNTER — APPOINTMENT (OUTPATIENT)
Dept: RADIATION ONCOLOGY | Age: 35
End: 2020-04-03
Attending: RADIOLOGY
Payer: COMMERCIAL

## 2020-04-03 ENCOUNTER — TELEPHONE (OUTPATIENT)
Dept: RADIATION ONCOLOGY | Age: 35
End: 2020-04-03

## 2020-04-03 PROCEDURE — 77336 RADIATION PHYSICS CONSULT: CPT | Performed by: RADIOLOGY

## 2020-04-03 NOTE — TELEPHONE ENCOUNTER
Pt called, states she would like to take RT off today. She said she's more tender by tx area and wants to just rest today and the weekend. Saw her yesterday before RT, noted  a few small blisters to site, no wet desquamation per pt.  Told her to start using

## 2020-04-06 ENCOUNTER — APPOINTMENT (OUTPATIENT)
Dept: RADIATION ONCOLOGY | Facility: HOSPITAL | Age: 35
End: 2020-04-06
Attending: RADIOLOGY
Payer: COMMERCIAL

## 2020-04-06 ENCOUNTER — HOSPITAL ENCOUNTER (OUTPATIENT)
Dept: RADIATION ONCOLOGY | Facility: HOSPITAL | Age: 35
Discharge: HOME | End: 2020-04-06
Attending: RADIOLOGY
Payer: COMMERCIAL

## 2020-04-06 PROCEDURE — 77412 RADIATION TX DELIVERY LVL 3: CPT | Performed by: RADIOLOGY

## 2020-04-06 PROCEDURE — 77280 THER RAD SIMULAJ FIELD SMPL: CPT | Performed by: RADIOLOGY

## 2020-04-06 PROCEDURE — 77332 RADIATION TREATMENT AID(S): CPT | Performed by: RADIOLOGY

## 2020-04-07 PROCEDURE — 77412 RADIATION TX DELIVERY LVL 3: CPT | Performed by: RADIOLOGY

## 2020-04-08 PROCEDURE — 77412 RADIATION TX DELIVERY LVL 3: CPT | Performed by: RADIOLOGY

## 2020-04-08 PROCEDURE — 77387 GUIDANCE FOR RADJ TX DLVR: CPT | Performed by: RADIOLOGY

## 2020-04-09 ENCOUNTER — HOSPITAL ENCOUNTER (OUTPATIENT)
Dept: RADIATION ONCOLOGY | Facility: HOSPITAL | Age: 35
Discharge: HOME | End: 2020-04-09
Attending: RADIOLOGY
Payer: COMMERCIAL

## 2020-04-09 ENCOUNTER — APPOINTMENT (OUTPATIENT)
Dept: RADIATION ONCOLOGY | Facility: HOSPITAL | Age: 35
End: 2020-04-09
Attending: RADIOLOGY
Payer: COMMERCIAL

## 2020-04-09 VITALS
OXYGEN SATURATION: 99 % | HEART RATE: 69 BPM | SYSTOLIC BLOOD PRESSURE: 119 MMHG | DIASTOLIC BLOOD PRESSURE: 63 MMHG | TEMPERATURE: 98 F | RESPIRATION RATE: 18 BRPM

## 2020-04-09 DIAGNOSIS — Z17.1 MALIGNANT NEOPLASM OF OVERLAPPING SITES OF RIGHT BREAST IN FEMALE, ESTROGEN RECEPTOR NEGATIVE (HCC): Primary | ICD-10-CM

## 2020-04-09 DIAGNOSIS — C50.811 MALIGNANT NEOPLASM OF OVERLAPPING SITES OF RIGHT BREAST IN FEMALE, ESTROGEN RECEPTOR NEGATIVE (HCC): Primary | ICD-10-CM

## 2020-04-09 PROCEDURE — 77387 GUIDANCE FOR RADJ TX DLVR: CPT | Performed by: RADIOLOGY

## 2020-04-09 PROCEDURE — 77412 RADIATION TX DELIVERY LVL 3: CPT | Performed by: RADIOLOGY

## 2020-04-09 NOTE — PROGRESS NOTES
[facility] Radiation Treatment Management Note 31-35    Patient:  Enrique Parent  Age:  36year old  Visit Diagnosis:  R breast CA, multicentric, s/p neoadj chemo, ypN1 disease, TN  Primary Rad/Onc:  Dr. Popeye Balderrama

## 2020-04-09 NOTE — PATIENT INSTRUCTIONS
POST-RADIATION INSTRUCTIONS:   - CALL [phone] FOR A FOLLOW-UP WITH DR. NOLASCO IN 3 months.    Please call sooner for any skin related concerns (if you develop open \"wet\" areas or increased pain/fever) & we will have you return for a nursing skin asse

## 2020-04-10 PROCEDURE — 77412 RADIATION TX DELIVERY LVL 3: CPT | Performed by: RADIOLOGY

## 2020-04-10 PROCEDURE — 77336 RADIATION PHYSICS CONSULT: CPT | Performed by: RADIOLOGY

## 2020-04-13 ENCOUNTER — DOCUMENTATION ONLY (OUTPATIENT)
Dept: RADIATION ONCOLOGY | Facility: HOSPITAL | Age: 35
End: 2020-04-13

## 2020-04-13 PROCEDURE — 77387 GUIDANCE FOR RADJ TX DLVR: CPT | Performed by: RADIOLOGY

## 2020-04-13 PROCEDURE — 77412 RADIATION TX DELIVERY LVL 3: CPT | Performed by: RADIOLOGY

## 2020-08-27 NOTE — PROGRESS NOTES
[facility] RADIATION ONCOLOGY  TREATMENT SUMMARY     PATIENT:  Simon Mike MD: Jeannie Bermudez MD  DIAGNOSIS:  Right breast cancer    HISTORY   37 yo woman s/p neoadjuvant Lupron + ddAC then T, R mastectomy, Ax LND, truncated adjuvant

## 2020-11-08 NOTE — ED PROVIDER NOTES
Patient Seen in: [facility] Emergency Department      History   Patient presents with:  Back Pain    Stated Complaint: chronic lower back pain    HPI    Patient is a 60-year-old female with a history of breast cancer, status postmastectomy in April clip-axilla   • OTHER SURGICAL HISTORY  2006    cyst removed from tail bone   • PORT-A-CATHETER  REMOVAL N/A 6/18/2019    Performed by Winifred Dodson MD at [facility]                    Social History    Tobacco Use      Smoking status:  Form sentences without any distress or retractions. Clear to auscultation bilaterally no wheezes or rales or rhonchi. Abdomen: Normoactive bowel sounds. Soft, nondistended. No HSM or masses.  Nontender throughout abdomen to superficial and deep palpation throu tonight to rule out malignant spread as she would likely have worsened outcome if this diagnosis is delayed.         MDM          MRI SPINE LUMBAR (W+WO) (CPT=72158)   Final Result    Addendum 1 of 1    This report includes an Addendum and supersedes previo levels. There is also     lesion involving the right side of     the S1 sacral segment. Partially visualized iliac bone lesions are seen.          Enhancing soft tissue is seen posterior to the L3 and L4 vertebra in the     epidural region, causing mild e for pain control and oncology consultation. However she states that she would prefer to go home and call her oncologist Dr. Sarita Cogan on Monday.     I discussed the patient with Dr. Sarita Cogan, reviewed the results, and he agrees that he would be able to see her in

## 2020-11-08 NOTE — ED INITIAL ASSESSMENT (HPI)
Pt here with c.o lower back pain that radiates down L leg. Pt states pain x 6 weeks, currently in PT for the same. Pt states MRI is scheduled for Nov 22.

## 2020-11-10 NOTE — PROGRESS NOTES
Nursing Re- Consult Note    Patient: Antonina French  YOB: 1985  Age: 36year old  Marilee Conn MD  Referring Physician: Leonel Walsh, Dr. Elma Meng, Dr. Lei Cevallos  Diagnosis: RIGHT BREAST CA W/METS  Consult Date: Constitutional: Negative. Loss of appetite   Eyes: Negative. Respiratory: Negative. Cardiovascular: Negative. Gastrointestinal: Positive for constipation. Genitourinary: Negative. Musculoskeletal: Positive for back pain.    Neurologi

## 2020-11-11 NOTE — PROGRESS NOTES
[facility]ATION [facility]COL[facility]  FOLLOW UP     PATIENT:   Gricel Veras      11/20/1985    DIAGNOSIS:   Metastatic breast cancer      CANCER HISTORY   37 yo woman with right breast cancer  -triple negative IDC  -BRCA negative  -upfront ddAC and Taxo skin reaction along the back, possibly esophagitis, and possible GI symptoms    Simulation will be today and will start radiation therapy as soon as possible  Anticipate systemic therapy to follow    She does have left hip pain which is bothersome but not

## 2020-11-19 PROBLEM — C78.7 LIVER METASTASIS (HCC): Status: ACTIVE | Noted: 2020-01-01

## 2020-11-19 PROBLEM — C78.01 MALIGNANT NEOPLASM METASTATIC TO RIGHT LUNG (HCC): Status: ACTIVE | Noted: 2020-01-01

## 2020-11-19 PROBLEM — C78.7 LIVER METASTASIS: Status: ACTIVE | Noted: 2020-01-01

## 2020-11-19 PROBLEM — C79.51 BONE METASTASIS: Status: ACTIVE | Noted: 2020-01-01

## 2020-11-19 PROBLEM — C79.51 BONE METASTASIS (HCC): Status: ACTIVE | Noted: 2020-01-01

## 2020-11-22 NOTE — PROGRESS NOTES
[facility] Radiation Treatment Management Note 1-5    Patient:  Gricel Veras  Age:  30year old  Visit Diagnosis:  Bone mets  Primary Rad/Onc:  Dr. Lelo West    Site Delivered Dose (cGy) Prescribed Dose (cGy) Fraction #   L2-L5 180

## 2020-11-30 NOTE — PATIENT INSTRUCTIONS
POST-RADIATION INSTRUCTIONS:   - CALL [phone] FOR A FOLLOW-UP WITH DR. NOLASCO IN [address] [facility] as scheduled    -You will wean off the Decadron/steroid medication as follows once you complete radiation:  Take 4 mg daily x 3 days,

## 2020-11-30 NOTE — PROGRESS NOTES
[facility] Radiation Treatment Management Note 6-10    Patient:  Johny Castellon  Age:  30year old  Visit Diagnosis:  Bone mets  Primary Rad/Onc:  Dr. Miguelito Adam    Site Delivered Dose (cGy) Prescribed Dose (cGy) Fraction #   L2-L5 27

## 2020-12-02 NOTE — PROGRESS NOTES
[facility] RADIATION ONCOLOGY  TREATMENT SUMMARY     PATIENT:  Wilfredo Toribio MD: Dr. Ron Cowart  DIAGNOSIS:  Metastatic breast cancer    HISTORY   29 yo woman with right breast cancer  -triple negative IDC  -BRCA negative  -upfront d

## 2020-12-26 PROBLEM — R06.02 SHORTNESS OF BREATH: Status: ACTIVE | Noted: 2020-01-01

## 2020-12-27 NOTE — CONSULTS
[facility]  Report of Consultation    Emeka Tan Patient Status:  Observation    1985 MRN [id number]   [facility] 4NW-A Attending Stephon Vazquez MD   Hosp Day # 0 PCP Gisela Calloway MD     Reason for Consultation:  Elsi Reno Disease Father         CHF   • Lipids Father    • Hypertension Father    • Lipids Mother    • Hypertension Mother    • Other (tumor) Mother         optic nerve   • Diabetes Paternal Grandfather    • Cancer Maternal Uncle         Prostate at 62      reports 97.6 °F (36.4 °C), temperature source Oral, resp. rate 18, height 1.626 m (5' 4\"), weight 104.6 kg (230 lb 8 oz), last menstrual period 11/21/2020, SpO2 91 %, not currently breastfeeding.        General: no acute distress  HEENT: sclera anicteric  Heart: n WALL:  Sequelae of right mastectomy is noted. LIMITED ABDOMEN:  Innumerable metastatic deposits throughout the liver are noted. BONES:  4 mm sclerotic focus in the right side of T9 is noted.   OTHER:  Negative.                     =====  CONCLUSION:

## 2020-12-27 NOTE — PLAN OF CARE
Pt alert and oriented x4. Drowsy. VSS. Complaining of mild RUQ pain, declining pain medication. Fentanyl patch in place on L upper arm. SOB with activity. HR tachy 100's, 130-140's with activity. CXR today negative. Pulm consulted for abnormal CT result.  Katya Qiu

## 2020-12-27 NOTE — H&P
[facility] hospitalist H+P  PCP Odalys Li MD  CC SOB  HPI 30 yo female with hx of breast cancer here with SOB for a few weeks. No fever.   No chest pain, she has come cough, she had episode of emesis overnight, currently no nausea/emesis, she had diarrhea, not b Social Needs      Financial resource strain: Not on file      Food insecurity        Worry: Not on file        Inability: Not on file      Transportation needs        Medical: Not on file        Non-medical: Not on file    Tobacco Use      Smoking status: tablet, Take 10 mg 1 hr prior to chemo, and then every 6 hours if needed for nausea., Disp: 30 tablet, Rfl: 3    •  ESCITALOPRAM 20 MG Oral Tab, TAKE 1 TABLET(20 MG) BY MOUTH DAILY, Disp: 90 tablet, Rfl: 0    •  HYDROcodone-acetaminophen 5-325 MG Oral Tab, metastatic disease. 2. Right perihilar mass with parenchymal extension to the pleural surface that measures 2.3 x 2.2 cm is noted. The peripheral extension measures approximately 5.4 x 1.1 cm. This is highly concerning for malignancy as well.      3.

## 2020-12-27 NOTE — ED PROVIDER NOTES
Patient Seen in: [facility] Emergency Department      History   Patient presents with:  Difficulty Breathing  Chemotherapy    Stated Complaint: chemo, sob neg covid    HPI    Vickie Rain is a pleasant 72-year-old female with a history of metastatic breast covid  Other systems are as noted in HPI. Constitutional and vital signs reviewed. All other systems reviewed and negative except as noted above.     Physical Exam     ED Triage Vitals [12/26/20 1913]   BP (!) 141/94   Pulse (!) 121   Resp 20   Temp 9 intervals and axes as noted on EKG Report.   Rate: 110  Rhythm: Sinus Rhythm  Reading: No areas of acute ST segment elevation or depression                      MDM      Given the patient's extensive metastatic disease likely contributory to her underlying

## 2020-12-27 NOTE — PLAN OF CARE
NURSING ADMISSION NOTE      Patient admitted via Cart  Oriented to room. Safety precautions initiated. Bed in low position. Call light in reach. Patient A+Ox4. Denies any pain. Able to ambulate to bed by self. Database completed.  Has left port

## 2020-12-27 NOTE — ED INITIAL ASSESSMENT (HPI)
Patient here with c/o SOB x 2 weeks but worse today. Patient is on chemotherapy, last one a week ago. Patient has breast cancer with mets to back, lungs.

## 2020-12-27 NOTE — CONSULTS
Pulmonary H&P/Consult     NAME: Delfino Howell - ROOM: [address] [place] [address] - MRN: [id number] - Age: 30year old - :  1985    Date of Admission: 2020  7:05 PM  Admission Diagnosis: Shortness of breath [R06.02]    Assessment/Plan:  1.  Pulmonary nodules lesion    • Breast cancer, right ([facility] [address] 2018   • Gestational diabetes     oral meds     Past Surgical History:   Procedure Laterality Date   • ACCESS PORT     • BREAST BIOPSY     •   2017   •  SECTION N/A 2017    Perfo Intake —   Output 1 ml   Net -1 ml     /83 (BP Location: Left arm)   Pulse 91   Temp 97.2 °F (36.2 °C) (Oral)   Resp 18   Ht 5' 4\" (1.626 m)   Wt 230 lb 8 oz (104.6 kg)   LMP 11/21/2020   SpO2 91%   BMI 39.57 kg/m²   General: No distress  Skin: No

## 2020-12-28 NOTE — PLAN OF CARE
Patient received alert and oriented, able to make needs known. No complaints of ain throughout the night. Ambulatory to the bathroom with stby assistance. Prn anti emetic given this morning for nausea with emesis X1.      Port to left chest with good

## 2020-12-28 NOTE — RESPIRATORY THERAPY NOTE
PT IS A RISK FOR SLEEP APNEA PER BERLING QUESTIONNAIRE, STANDING ORDERS FOR ANEL PLACED IN EFFECT PER PROTOCOL TO MONITOR.

## 2020-12-28 NOTE — PROGRESS NOTES
[address] [facility] Patient Status:  Observation    1985 MRN [id number]   [facility] 4NW-A Attending Cedric Green, *   Hosp Day # 0 PCP Marcello Reyes MD     SUBJECTIVE: Pt denies complaints of SOB or cough this Exam:                          General: alert, cooperative, in NAD                          HEENT: oropharynx clear without erythema or exudates, moist mucous membranes                          Lungs: Clear to auscultation bilaterally, no wheezes or crackl with BAL at 7am tomorrow am  -The rationale for performing the bronchoscopy, including risks and benefits were explained to the patient and questions were answered.   The risks of drug reactions, bleeding, collapsed lung, fevers, and hoarseness were discuss

## 2020-12-28 NOTE — PLAN OF CARE
Pt alert and oriented x4, drowsy/fatigued. Vitals stable on room air. No complaints of pain or SOB. Was NPO for bronchoscopy, but bronch postponed to tomorrow morning due to scheduling issues. Pt able to resume diet, but to be NPO after midnight.  Pt update

## 2020-12-28 NOTE — PROGRESS NOTES
[facility] Hospitalist Progress Note     PCP: Hermelinda Colón MD    CC:  Follow up    SUBJECTIVE:  Feels better today but nauseated.  at bedside.   Tachycardic to 120s upon my visit, at rest (just returned from restroom)    OBJECTIVE:  Temp:  [98 °F (36.7 ° enoxaparin  40 mg Subcutaneous Daily   • escitalopram  20 mg Oral Daily   • fentaNYL  1 patch Transdermal Q72H       Loperamide HCl, Prochlorperazine Edisylate, acetaminophen **OR** HYDROcodone-acetaminophen **OR** HYDROcodone-acetaminophen, PEG 3350, magn

## 2020-12-28 NOTE — DIETARY NOTE
[facility] [facility] ASSESSMENT    Pt does not meet malnutrition criteria at this time.     NUTRITION DIAGNOSIS/PROBLEM:  Unintentional weight loss related to chronic disease (metastatic breast cancer) as evidenced by 30# (12%) wt loss in 6 mo BID    FOOD/NUTRITION RELATED HISTORY:  Appetite: ALVARADO  Intake: 50%  Intake Meeting Needs: Marginal, added supplements  Food Allergies: No  Cultural/Ethnic/Judaism Preferences Addresses: Yes    NUTRITION RELATED PHYSICAL FINDINGS:  1. Body Fat/Muscle Mass

## 2020-12-28 NOTE — PROGRESS NOTES
[facility]  Progress Note    Ponce Skiff Patient Status:  Observation    1985 MRN [id number]   [facility] 4NW-A Attending Dee Singh MD   Hosp Day # 0 PCP Coco Bowen MD     Subjective:    Feeling better     Cory Lute 150.0 - 450.0 10(3)uL    MCV 84.7 80.0 - 100.0 fL    MCH 28.6 26.0 - 34.0 pg    MCHC 33.8 31.0 - 37.0 g/dL    RDW 14.4 11.0 - 15.0 %    RDW-SD 43.3 35.1 - 46.3 fL   BASIC METABOLIC PANEL (8)    Collection Time: 12/28/20  5:35 AM   Result Value Ref Range Pradeep Orantes MD

## 2020-12-29 NOTE — PROGRESS NOTES
[facility] Hospitalist Progress Note     PCP: Emeka Warner MD    CC:  Follow up    SUBJECTIVE:  Tired, just returned from [address] Had n/v today and d.   at bedside    OBJECTIVE:  Temp:  [98.1 °F (36.7 °C)-99.7 °F (37.6 °C)] 98.4 °F (36.9 °C)  Pulse:  [17 HYDROcodone-acetaminophen **OR** HYDROcodone-acetaminophen, PEG 3350, magnesium hydroxide, bisacodyl, Fleet Enema, ondansetron HCl, LORazepam       Assessment/Plan:     Principal Problem:    Shortness of breath       SOB, pt with abnormal CT with numerous

## 2020-12-29 NOTE — PLAN OF CARE
Patient received alert and oriented. No complaints of pain this shift, zofran gi mann X1 re nausea, no emesis noted. Placed on 1.5 L via nasal cannula tonight re: desating to 88%-90% while asleep, non sustained. Otherwise uneventful shift.  Remained npo

## 2020-12-29 NOTE — PROGRESS NOTES
[facility]  Progress Note    Monondariela Hamilton Patient Status:  Observation    1985 MRN [id number]   [facility] 4NW-A Attending Jess Weathers MD   Hosp Day # 0 PCP Danial Gosselin, MD     Subjective:    Pt is [address]

## 2020-12-29 NOTE — OPERATIVE REPORT
Bronchoscopy Procedure Report     Preop diagnosis:       Abnormal CT  Postop diagnosis:      Abnormal CT  Procedure performed: Bronchoscopy, Diagnostic  Bronchoalveolar lavage, BAL     Sedation used:          4 mg of versed, 100 mcg of fentanyl, topical li

## 2020-12-29 NOTE — PROGRESS NOTES
[address] [facility] Patient Status:  Observation    1985 MRN [id number]   [facility] 4NW-A Attending Luis M Coles, *   Hosp Day # 0 PCP Mundo Van MD     SUBJECTIVE: No acute events overnight.   Pt with some n Medications Ordered in Other Encounters:   •  0.9% NaCl infusion, 25 mL/hr, Intravenous, Continuous      Physical Exam:                          General: alert, cooperative, in NAD                          HEENT: oropharynx clear without erythema or exudat Kelly Nugent MD

## 2020-12-30 NOTE — DISCHARGE SUMMARY
General Medicine Discharge Summary     Patient ID:  Jose Roldan  30year old  11/20/1985    Admit date: 12/26/2020    Discharge date and time:  12/30/20    Attending Physician: Megan Vernon palliative chemo     Diarrhea; stool negative for c.diff  - Improved     Nausea/emesis overnight- KUB without acute issue  -supportive care, antiemetics  -improved     LFTs chronically elevated (actually better on this admit than before)  Pt with liver met MG Intramuscular  Inject 22.5 mg into the muscle every 3 (three) months. Last one in 9/2019     !! - Potential duplicate medications found. Please discuss with provider.       STOP taking these medications    dexamethasone (DECADRON) 4 MG tablet          Ac

## 2020-12-30 NOTE — PAYOR COMM NOTE
--------------  ADMISSION REVIEW     Payor: [address]  Subscriber #:  [id number]  Authorization Number: [id number]    Admit date: 12/29/20  Admit time: [address]       Admitting Physician: Ginette Beckman MD  Attending Physician:  Jos Frances following components:       Result Value    BUN 4 (*)     Creatinine 0.30 (*)     AST 92 (*)     ALT 85 (*)     Alkaline Phosphatase 100 (*)     Albumin 2.6 (*)     A/G Ratio 0.6 (*)     All other components within normal limits   CBC W/ DIFFERENTIAL - Abn overnight, currently no nausea/emesis, she had diarrhea, not bloody, pt denies bleeding. Denies sick contacts.  Currently pain quantity, quality, duration, radiation absent      ROS 10 systems reviewed and negative except as in HPI  PE    12/27/20  0723   B focus near T9, unnumerable mets throught liver; consult oncology    Diarrhea; stool negative for c.diff  Pt denies new meds  Check stool culture, add probiotic, change to BRAT diet with monitoring    Nausea/emesis overnight  Check Abd X-ray  LFTs chronical dyspnea on exertion.  She really wants to go home.      Meds:  • Acidophilus/Pectin  1 capsule Oral Daily   • lidocaine-prilocaine   Topical Once   • enoxaparin  40 mg Subcutaneous Daily   • escitalopram  20 mg Oral Daily   • fentaNYL  1 patch Transdermal Q metastatic breast cancer. Pt received palliative abraxane and tecentriq on 12/8. COVID testing negative 12/22 and 12/26. Aspergillus galactomannan neg.  Bronch done 12/29, results pending  - await bronch cultures and cytology from 12/29  - follow up urine

## 2020-12-30 NOTE — PROGRESS NOTES
Alert and orientated x4. Denies pain. On 2 liters nasal canula post bronchoscopy. Requesting something for sleep. Ativan given. On tele and cpox. saurations above 95%. Up to abthroom, bed alarm on. Given nausea medication. No stools.

## 2020-12-30 NOTE — PAYOR COMM NOTE
--------------  CONTINUED STAY REVIEW    Payor: [facility]  Subscriber #:  [id number]  Authorization Number: [id number]        Admitting Physician: Jess Weathers MD  Attending Physician:  Vignesh Becerra, *  Primary Care Physicia Acidophilus/Pectin (PROBIOTIC) CAPS 1 capsule, 1 capsule, Oral, Daily  •  Prochlorperazine Edisylate (COMPAZINE) injection 5 mg, 5 mg, Intravenous, Q6H PRN  •  lidocaine-prilocaine (EMLA) cream, , Topical, Once  •  Enoxaparin Sodium (LOVENOX) 40 MG/0.4ML HGB 10.8 12/28/2020     HCT 32.0 12/28/2020     MCV 84.7 12/28/2020     MCH 28.6 12/28/2020     MCHC 33.8 12/28/2020     RDW 14.4 12/28/2020     .0 12/28/2020            Lab Results   Component Value Date      12/28/2020     K 3.9 12/28/2020 metastatic recurrence of her disease and was rrecently started on palliative chemotherapy with abraxane/tecentriq.  She is admitted with shortness of breath.     She reports having a sharp pain with deep breaths since the day after her chemotherapy treatmen never used smokeless tobacco. She reports current alcohol use.  She reports that she does not use drugs.     Allergies:     Naprosyn [Naproxen]     RASH     Medications:     Current Facility-Administered Medications:   •  Acidophilus/Pectin (PROBIOTIC) CAPS Value Date     WBC 5.9 12/27/2020     HGB 10.7 12/27/2020     HCT 31.7 12/27/2020     .0 12/27/2020     CREATSERUM 0.45 12/27/2020     BUN 6 12/27/2020      12/27/2020     K 3.8 12/27/2020      12/27/2020     CO2 26.0 12/27/2020     GLU are more than likely due to metastatic disease.     2.  Right perihilar mass with parenchymal extension to the pleural surface that measures 2.3 x 2.2 cm is noted.  The peripheral extension measures approximately 5.4 x 1.1 cm.  This is highly concerning for

## 2020-12-30 NOTE — PLAN OF CARE
Alert and oriented, slept through the nite on room air, denies pain, slight sore throat but has no hemoptysis, one day post bronchoscopy.  O2 walk was performed, 95% on RA at rest and 92% with activity at RA, no SOB was noted but was tachycardic which she r forceful nose blowing  Outcome: Progressing

## 2020-12-30 NOTE — DIETARY NOTE
[facility]  NUTRITION FOLLOW-UP ASSESSMENT    Pt does not meet malnutrition criteria at this time.     NUTRITION DIAGNOSIS/PROBLEM:  Unintentional weight loss related to chronic disease (metastatic breast cancer) as evidenced by 30# (12%) wt loss in 6 : 108 kg (238 lb 3.2 oz)  11/12/20 : 106.6 kg (235 lb)    Patient Weight for the past 72 hrs:   Weight   12/26/20 1913 102.1 kg (225 lb)   12/26/20 2229 104.6 kg (230 lb 8 oz)   12/28/20 0527 101.6 kg (223 lb 14.4 oz)     NUTRITION:  Diet: Orders Placed Th

## 2020-12-30 NOTE — PROGRESS NOTES
[facility]UL[facility]    S: Pt underwent bronch w/ RUL BAL yesterday. Still has some dyspnea on exertion. She really wants to go home.      Meds:  • Acidophilus/Pectin  1 capsule Oral Daily   • lidocaine-prilocaine   Topical Once   • enoxaparin  40 mg pneumonitis, vs progression of underlying metastatic breast cancer. Pt received palliative abraxane and tecentriq on 12/8. COVID testing negative 12/22 and 12/26. Aspergillus galactomannan neg.  Bronch done 12/29, results pending  - await bronch cultures a

## 2020-12-30 NOTE — PLAN OF CARE
Had bronchoscopy today, returned to room, groggy but arouses easily. No distress, on O2 at 2 liters, has mild sore throat, no hemoptysis noted. Diet was resumed, did not feel like eating, drank fluids, no aspirations noted.  Up ad jay in the room, voiding i

## 2020-12-31 NOTE — PAYOR COMM NOTE
--------------  DISCHARGE REVIEW    Payor: [address]  Subscriber #:  [id number]  Authorization Number: [id number]    Admit date: 12/29/20  Admit time:  1501  Discharge Date: 12/30/2020  7:27 PM     Admitting Physician: Cindi Alford MD afebrile, no leukocytosis, COVID not detected  -concern for possible pneumonitis, infectious process, or from malignancy  -s/p bronch 12/29- no evidence of infection to date. Pulm has recommended steroids for possible pneumonitis.  Patient would like to go access    LORAZEPAM 1 MG Oral Tab  Take 1 tablet (1 mg total) by mouth 2 (two) times daily as needed for Anxiety. Prochlorperazine Maleate (COMPAZINE) 10 mg tablet  Take 10 mg 1 hr prior to chemo, and then every 6 hours if needed for nausea.     DEVANTE

## 2021-01-01 ENCOUNTER — HOSPITAL ENCOUNTER (OUTPATIENT)
Dept: RADIATION ONCOLOGY | Facility: HOSPITAL | Age: 36
Discharge: HOME | End: 2021-01-01
Attending: RADIOLOGY
Payer: COMMERCIAL

## 2021-01-01 ENCOUNTER — DOCUMENTATION ONLY (OUTPATIENT)
Dept: RADIATION ONCOLOGY | Facility: HOSPITAL | Age: 36
End: 2021-01-01

## 2021-01-01 ENCOUNTER — TELEPHONE (OUTPATIENT)
Dept: RADIATION ONCOLOGY | Facility: HOSPITAL | Age: 36
End: 2021-01-01

## 2021-01-01 VITALS
HEART RATE: 117 BPM | RESPIRATION RATE: 20 BRPM | BODY MASS INDEX: 37 KG/M2 | DIASTOLIC BLOOD PRESSURE: 80 MMHG | WEIGHT: 214 LBS | TEMPERATURE: 98 F | SYSTOLIC BLOOD PRESSURE: 122 MMHG | OXYGEN SATURATION: 96 %

## 2021-01-01 VITALS
OXYGEN SATURATION: 96 % | SYSTOLIC BLOOD PRESSURE: 119 MMHG | TEMPERATURE: 98 F | HEART RATE: 105 BPM | RESPIRATION RATE: 18 BRPM | DIASTOLIC BLOOD PRESSURE: 78 MMHG

## 2021-01-01 VITALS
SYSTOLIC BLOOD PRESSURE: 130 MMHG | HEART RATE: 100 BPM | RESPIRATION RATE: 20 BRPM | DIASTOLIC BLOOD PRESSURE: 87 MMHG | OXYGEN SATURATION: 98 % | BODY MASS INDEX: 35 KG/M2 | WEIGHT: 206.63 LBS | TEMPERATURE: 97 F

## 2021-01-01 DIAGNOSIS — Z17.1 MALIGNANT NEOPLASM OF OVERLAPPING SITES OF RIGHT BREAST IN FEMALE, ESTROGEN RECEPTOR NEGATIVE (HCC): Primary | ICD-10-CM

## 2021-01-01 DIAGNOSIS — R42 VERTIGO: ICD-10-CM

## 2021-01-01 DIAGNOSIS — C79.31 BRAIN METASTASES (HCC): Primary | ICD-10-CM

## 2021-01-01 DIAGNOSIS — C50.919 METASTATIC BREAST CANCER (HCC): ICD-10-CM

## 2021-01-01 DIAGNOSIS — R11.0 NAUSEA: ICD-10-CM

## 2021-01-01 DIAGNOSIS — C50.811 MALIGNANT NEOPLASM OF OVERLAPPING SITES OF RIGHT BREAST IN FEMALE, ESTROGEN RECEPTOR NEGATIVE (HCC): Primary | ICD-10-CM

## 2021-01-01 LAB
CYTOMEGALOVIRUS DETECTION, PCR: NOT DETECTED
NON GYNE INTERPRETATION: NEGATIVE
P. JIROVECII DETECTION BY PCR: NOT DETECTED

## 2021-01-01 PROCEDURE — 77412 RADIATION TX DELIVERY LVL 3: CPT | Performed by: RADIOLOGY

## 2021-01-01 PROCEDURE — 77387 GUIDANCE FOR RADJ TX DLVR: CPT | Performed by: RADIOLOGY

## 2021-01-01 PROCEDURE — 77399 UNLISTED PX MED RADJ PHYSICS: CPT | Performed by: RADIOLOGY

## 2021-01-01 PROCEDURE — 77470 SPECIAL RADIATION TREATMENT: CPT | Performed by: RADIOLOGY

## 2021-01-01 PROCEDURE — 77336 RADIATION PHYSICS CONSULT: CPT | Performed by: RADIOLOGY

## 2021-01-01 PROCEDURE — 77334 RADIATION TREATMENT AID(S): CPT | Performed by: RADIOLOGY

## 2021-01-01 PROCEDURE — 77280 THER RAD SIMULAJ FIELD SMPL: CPT | Performed by: RADIOLOGY

## 2021-01-01 PROCEDURE — 77300 RADIATION THERAPY DOSE PLAN: CPT | Performed by: RADIOLOGY

## 2021-01-01 PROCEDURE — 99213 OFFICE O/P EST LOW 20 MIN: CPT

## 2021-01-01 PROCEDURE — 77290 THER RAD SIMULAJ FIELD CPLX: CPT | Performed by: RADIOLOGY

## 2021-01-01 PROCEDURE — 77295 3-D RADIOTHERAPY PLAN: CPT | Performed by: RADIOLOGY

## 2021-01-01 RX ORDER — PANTOPRAZOLE SODIUM 40 MG/1
40 TABLET, DELAYED RELEASE ORAL DAILY
Qty: 30 TABLET | Refills: 0 | Status: SHIPPED | OUTPATIENT
Start: 2021-01-01

## 2021-01-01 RX ORDER — SOD PHOS DI, MONO/K PHOS MONO 250 MG
1 TABLET ORAL 2 TIMES DAILY
COMMUNITY
Start: 2021-01-01 | End: 2021-01-01

## 2021-01-01 RX ORDER — ALPRAZOLAM 0.5 MG/1
TABLET ORAL
Qty: 10 TABLET | Refills: 0 | Status: SHIPPED | OUTPATIENT
Start: 2021-01-01 | End: 2021-01-01

## 2021-01-07 NOTE — PROGRESS NOTES
Call placed to pt, no answer. Unable to leave voicemail as mailbox full. Needs call back. uSamp message sent.

## 2021-01-10 NOTE — PAYOR COMM NOTE
--------------  WE ARE STILL AWAITING YOUR DETERMINATION ON THIS INPT ADMISSION.     PLEASE FAX INPT DAYS AUTHORIZED ASAP -[phone]    [address] YOU!    DISCHARGE REVIEW    Payor: [address] [place]  Subscriber #:  [id number]  Authorization Number: U numerous scattered nodules, mass, mediastinal lumphadenopathy, ground glass opacities  Consult oncology and pulmonology, appreciate  Pt is afebrile, no leukocytosis, COVID not detected  -concern for possible pneumonitis, infectious process, or from maligna Transdermal Patch 72 Hr  Place 1 patch onto the skin every third day. lidocaine-prilocaine 2.5-2.5 % External Cream  Use over port prior to access    LORAZEPAM 1 MG Oral Tab  Take 1 tablet (1 mg total) by mouth 2 (two) times daily as needed for Anxiety.

## 2021-03-18 NOTE — PROGRESS NOTES
[facility]  FOLLOW UP     PATIENT:   Lucero Coombs      11/20/1985    DIAGNOSIS:   Metastatic breast CA      CANCER HISTORY    29 yo woman with right breast cancer  -triple negative IDC  -BRCA negative  -upfront ddAC and Taxol f is extensive and carbo/gem radiosensitize    After just 1 cycle, not sure if we have evidence that chemo is not working    Would try to wait 1 week after RT to resume systemic Rx  She may have pharyngitis/cervical esophagitis post RT    Planning 30 Gy in 1

## 2021-03-18 NOTE — PROGRESS NOTES
Nursing Re- Consult Note    Patient: Stewart Addison  YOB: 1985  Age: 30year old  Bhargav Barone MD  Referring Physician: Katia Santos  Diagnosis: RIGHT BREAST WITH BRAIN METS   Consult Date: 3/18/2021    Hi

## 2021-03-18 NOTE — PATIENT INSTRUCTIONS
- A NEW ORDER FOR PANTOPRAZOLE WAS PLACED TO YOUR PHARMACY, PLEASE TAKE DAILY FIRST THING THE MORNING    - DECREASE DEXAMETHASONE (STEROID MEDICATION) TO 4MG (1 TABLET) TWICE A DAY (ONE WITH BREAKFAST, ONE AT LUNCHTIME)    - WE WILL CALL TO SCHEDULE YOUR F

## 2021-03-22 NOTE — PROGRESS NOTES
Rad Onc    Including whole brain, skull, and the C spine down to the T2 level would cause significant toxicity. The C spine MRI shows disc protrusion at C4-C5 but no malignant epidural extension. Will treat whole brain, C1, and C2 this time.  Will go on

## 2021-03-29 NOTE — PROGRESS NOTES
[facility] Radiation Treatment Management Note 1-5    Patient:  Antonina French  Age:  30year old  Visit Diagnosis:    1.  Malignant neoplasm of overlapping sites of right breast in female, estrogen receptor negative ([facility] [address]      Prima

## 2021-04-05 NOTE — PROGRESS NOTES
[facility] Radiation Treatment Management Note 6-10    Patient:  Ponce Skiff  Age:  30year old  Visit Diagnosis:    1.  Malignant neoplasm of overlapping sites of right breast in female, estrogen receptor negative ([facility] [address]      Prim

## 2021-04-09 NOTE — ADDENDUM NOTE
Encounter addended by:  Gema Dominguez RN on: 4/9/2021 9:13 AM   Actions taken: Order Reconciliation Section accessed, Clinical Note Signed

## 2021-04-09 NOTE — PATIENT INSTRUCTIONS
POST-RADIATION INSTRUCTIONS:   - CALL [phone] FOR A FOLLOW-UP WITH DR. NOLASCO 1 MONTH AFTER RADIATION COMPLETION  - FOLLOW-UP WITH DR. GONZALEZ AFTER RADIATION COMPLETION  - SIDE EFFECTS OF RADIATION WILL GRADUALLY SUBSIDE.  IT MAY TAKE 1-2 WEEKS POST-RAD

## 2021-04-12 NOTE — PROGRESS NOTES
[facility]  TREATMENT SUMMARY     PATIENT:  Wilfredo Toribio MD: Ron Cowart MD  DIAGNOSIS:  Metastatic breast cancer    HISTORY   50-year-old woman with triple negative metastatic right breast cancer.   Completed [facility]

## 2021-08-25 NOTE — TELEPHONE ENCOUNTER
Dr. Eli Price from [place] [address] is calling and needs to speak with Dr. Kareem MATOS before 2:00 p.m. She is doing some kind of procedure at 2:00 PM on this patient and want to make sure she is not duplicating something that have been done by Dr. Carmen Suarez.

## 2022-02-09 NOTE — ED NOTES
Problem: Patient/Family Goals  Goal: Patient/Family Long Term Goal  Description: Patient's Long Term Goal: To go home    Interventions:  - IV fluids as ordered  - Monitor intake and output  - Administer PRN medications if needed  - See additional Care Plan goals for specific interventions  Outcome: Progressing  Goal: Patient/Family Short Term Goal  Description: Patient's Short Term Goal: No more abdominal pain    Interventions:   - IV fluids  - Monitor vital signs  - Monitor for signs and symptoms of pain  - See additional Care Plan goals for specific interventions  Outcome: Progressing     Problem: GASTROINTESTINAL - PEDIATRIC  Goal: Minimal or absence of nausea and vomiting  Description: INTERVENTIONS:  - Maintain adequate hydration with IV or PO as ordered and tolerated  - Nasogastric tube to low intermittent suction as ordered  - Evaluate effectiveness of ordered antiemetic medications  - Provide nonpharmacologic comfort measures as appropriate  - Advance diet as tolerated, if ordered  - Obtain nutritional consult as needed  - Evaluate fluid balance  Outcome: Progressing  Goal: Maintains or returns to baseline bowel function  Description: INTERVENTIONS:  - Assess bowel function  - Maintain adequate hydration with IV or PO as ordered and tolerated  - Evaluate effectiveness of GI medications  - Encourage mobilization and activity  - Obtain nutritional consult as needed  - Establish a toileting routine/schedule  - Consider collaborating with pharmacy to review patient's medication profile  Outcome: Progressing     Problem: PAIN - PEDIATRIC  Goal: Verbalizes/displays adequate comfort level or patient's stated pain goal  Description: INTERVENTIONS:  - Encourage pt to monitor pain and request assistance  - Assess pain using appropriate pain scale  - Administer analgesics based on type and severity of pain and evaluate response  - Implement non-pharmacological measures as appropriate and evaluate response  - Consider Pt taken to MRI cultural and social influences on pain and pain management  - Manage/alleviate anxiety  - Utilize distraction and/or relaxation techniques  - Monitor for opioid side effects  - Notify MD/LIP if interventions unsuccessful or patient reports new pain  - Anticipate increased pain with activity and pre-medicate as appropriate  Outcome: Progressing     Problem: SAFETY PEDIATRIC - FALL  Goal: Free from fall injury  Description: INTERVENTIONS:  - Assess pt frequently for physical needs  - Identify cognitive and physical deficits and behaviors that affect risk of falls. - [place] fall precautions as indicated by assessment.  - Educate pt/family on patient safety including physical limitations  - Instruct pt to call for assistance with activity based on assessment  - Modify environment to reduce risk of injury  - Provide assistive devices as appropriate  - Consider OT/PT consult to assist with strengthening/mobility  - Encourage toileting schedule  Outcome: Progressing     Problem: THERMOREGULATION - /PEDIATRICS  Goal: Maintains normal body temperature  Description: INTERVENTIONS:INTERVENTIONS:INTERVENTIONS:  - Monitor temperature as ordered  - Monitor for signs of hypothermia or hyperthermia  - Provide thermal support measures  - Wean to open crib when appropriate  Outcome: Progressing    T-max of 103.9 overnight. PRN fever medication given overnight with positive effect. Will continue to monitor.

## 2022-09-17 NOTE — TELEPHONE ENCOUNTER
Dr. Fariha Dial from [facility] called and needs to speak with Dr. Zaida Miranda before 3:00 p.m. today. This is Dr. Marj Feliciano number is [phone]. She said it is very important. Thank you.
yes

## 2024-08-01 NOTE — PROGRESS NOTES
SUBJECTIVE:   pt without complaints. Feeling only mild, infrequent contractions. no abdominal pain. Good fetal movement. no vaginal discharge    OBJECTIVE:  VS:  height is 5' 3\" (1.6 m) and weight is 266 lb (120.7 kg).  Her oral temperature is 97.6 °F (36 none

## (undated) DEVICE — FILTERLINE NASAL ADULT O2/CO2

## (undated) DEVICE — MEDI-VAC NON-CONDUCTIVE SUCTION TUBING: Brand: CARDINAL HEALTH

## (undated) DEVICE — 3M™ RED DOT™ MONITORING ELECTRODE WITH FOAM TAPE AND STICKY GEL, 50/BAG, 20/CASE, 72/PLT 2570: Brand: RED DOT™

## (undated) DEVICE — MEDI-VAC SUCTION HANDLE REGULAR CAPACITY: Brand: CARDINAL HEALTH

## (undated) DEVICE — 1200CC GUARDIAN II: Brand: GUARDIAN

## (undated) DEVICE — SPECIMEN TRAP LUKI

## (undated) DEVICE — Device: Brand: DEFENDO AIR/WATER/SUCTION AND BIOPSY VALVE

## (undated) DEVICE — ENDOSCOPY PACK - LOWER: Brand: MEDLINE INDUSTRIES, INC.

## (undated) NOTE — ED AVS SNAPSHOT
Ky Campa   MRN: [id number]    Department:  [facility] Emergency Department   Date of Visit:  7/27/2019           Disclosure     Insurance plans vary and the physician(s) referred by the ER may not be covered by your plan.  Please contact you tell this physician (or your personal doctor if your instructions are to return to your personal doctor) about any new or lasting problems. The primary care or specialist physician will see patients referred from the [facility] Emergency Department.  Mo Avery

## (undated) NOTE — LETTER
Talisha Perez 89  [address] [facility] [facility] [address] [facility]  Authorization for Surgical Operation and Procedure     Date:___________                                                                                                         Time:__________ 4.   Should the need arise during my operation or immediate post-operative period, I also consent to the administration of blood and/or blood products.   Further, I understand that despite careful testing and screening of blood or blood products by alia 8.   I recognize that in the event my procedure results in extended X-Ray/fluoroscopy time, I may develop a skin reaction. 9.  If I have a Do Not Attempt Resuscitation (DNAR) order in place, that status will be suspended while in the operating room, proc 1. IGomez agree to be cared for by an anesthesiologist, who is specially trained to monitor me and give me medicine to put me to sleep or keep me comfortable during my procedure    I understand that my anesthesiologist is not an employee or age 5. My doctor has explained to me other choices available to me for my care (alternatives).   6. Pregnant Patients (“epidural”):  I understand that the risks of having an epidural (medicine given into my back to help control pain during labor), include itchi

## (undated) NOTE — LETTER
[facility]  Kishore Galarza 62 [address] [facility], [address] [place] [address]    Consent for Operation    Date: ______11/21/2017____________    Time: ______1740      1.  I authorize the performance upon Gloria Denney the following operation:                              Pr videotape. The [facility] will not be responsible for storage or maintenance of this tape. 6. For the purpose of advancing medical education, I consent to the admittance of observers to the Operating Room.     7. I authorize the use of any specimen, organs Signature of Patient:   ___________________________    When the patient is a minor or mentally incompetent to give consent:  Signature of person authorized to consent for patient: ___________________________   Relationship to patient: _____________________ · If I am allergic to anything or have had a reaction to anesthesia before. 3. I understand how the anesthesia medicine will help me (benefits). 4. I understand that with any type of anesthesia medicine there are risks:  a.  The most common risks are: their representative has agreed to have anesthesia services.     _____________________________________________________________________________  Witness        Date   Time  I have verified that the signature is that of the patient or patient’s representative

## (undated) NOTE — MR AVS SNAPSHOT
After Visit Summary   2017    Radha June    MRN: [id number]           Visit Information     Date & Time  2017  9:00 AM Provider  [address]  Dept[address]  Phone  [phone]      Your Vitals Were     BP   12 following locations to schedule your imaging test.     [address] [facility]. [address]: [phone]  [address]:  [phone]  [address] [place] [address] [place]: [phone] [address] [facility].  [address] [facility]. [address] [place]: [phone]  [address] [facility], [address]: [phone] [address] [facility]. [address]: [phone]  [address] [place] [address]: [phone]  [id number]

## (undated) NOTE — Clinical Note
IMPRESSION: IUP at 21w3d Obesity Prior LEEP: normal cervical length assessment  RECOMMENDATIONS: Continue care with Dr. Jeannie Robison ultrasound at 32 weeks. Weekly NST's at 36 weeks.